# Patient Record
Sex: MALE | Race: WHITE | Employment: OTHER | URBAN - METROPOLITAN AREA
[De-identification: names, ages, dates, MRNs, and addresses within clinical notes are randomized per-mention and may not be internally consistent; named-entity substitution may affect disease eponyms.]

---

## 2018-10-16 ENCOUNTER — APPOINTMENT (OUTPATIENT)
Dept: GENERAL RADIOLOGY | Age: 73
End: 2018-10-16
Attending: UROLOGY
Payer: MEDICARE

## 2018-10-16 ENCOUNTER — ANESTHESIA (OUTPATIENT)
Dept: OPERATING ROOM | Age: 73
End: 2018-10-16
Payer: MEDICARE

## 2018-10-16 ENCOUNTER — HOSPITAL ENCOUNTER (OUTPATIENT)
Age: 73
Setting detail: OUTPATIENT SURGERY
Discharge: HOME | End: 2018-10-16
Attending: UROLOGY | Admitting: UROLOGY
Payer: MEDICARE

## 2018-10-16 ENCOUNTER — ANESTHESIA EVENT (OUTPATIENT)
Dept: OPERATING ROOM | Age: 73
End: 2018-10-16
Payer: MEDICARE

## 2018-10-16 VITALS
WEIGHT: 204.31 LBS | OXYGEN SATURATION: 97 % | RESPIRATION RATE: 16 BRPM | HEART RATE: 88 BPM | SYSTOLIC BLOOD PRESSURE: 164 MMHG | TEMPERATURE: 96.8 F | HEIGHT: 73 IN | DIASTOLIC BLOOD PRESSURE: 86 MMHG | BODY MASS INDEX: 27.08 KG/M2

## 2018-10-16 VITALS — SYSTOLIC BLOOD PRESSURE: 93 MMHG | OXYGEN SATURATION: 98 % | DIASTOLIC BLOOD PRESSURE: 54 MMHG

## 2018-10-16 DIAGNOSIS — G89.18 POSTOPERATIVE PAIN: Primary | ICD-10-CM

## 2018-10-16 LAB
ANION GAP SERPL CALCULATED.3IONS-SCNC: 12 MMOL/L (ref 9–17)
BUN BLDV-MCNC: 16 MG/DL (ref 8–23)
CHLORIDE BLD-SCNC: 103 MMOL/L (ref 98–107)
CO2: 25 MMOL/L (ref 20–31)
CREAT SERPL-MCNC: 0.7 MG/DL (ref 0.7–1.2)
GFR AFRICAN AMERICAN: >60 ML/MIN
GFR NON-AFRICAN AMERICAN: >60 ML/MIN
GFR SERPL CREATININE-BSD FRML MDRD: NORMAL ML/MIN/{1.73_M2}
GFR SERPL CREATININE-BSD FRML MDRD: NORMAL ML/MIN/{1.73_M2}
GLUCOSE BLD-MCNC: 122 MG/DL (ref 75–110)
GLUCOSE BLD-MCNC: 133 MG/DL (ref 75–110)
HCT VFR BLD CALC: 41 % (ref 41–53)
HEMOGLOBIN: 13.9 G/DL (ref 13.5–17.5)
MCH RBC QN AUTO: 27.3 PG (ref 26–34)
MCHC RBC AUTO-ENTMCNC: 33.8 G/DL (ref 31–37)
MCV RBC AUTO: 80.9 FL (ref 80–100)
NRBC AUTOMATED: NORMAL PER 100 WBC
PDW BLD-RTO: 13.8 % (ref 11.5–14.5)
PLATELET # BLD: 306 K/UL (ref 130–400)
PMV BLD AUTO: 10.1 FL (ref 6–12)
POTASSIUM SERPL-SCNC: 4.6 MMOL/L (ref 3.7–5.3)
RBC # BLD: 5.07 M/UL (ref 4.5–5.9)
SODIUM BLD-SCNC: 140 MMOL/L (ref 135–144)
WBC # BLD: 9.5 K/UL (ref 3.5–11)

## 2018-10-16 PROCEDURE — 3600000012 HC SURGERY LEVEL 2 ADDTL 15MIN: Performed by: UROLOGY

## 2018-10-16 PROCEDURE — 74018 RADEX ABDOMEN 1 VIEW: CPT

## 2018-10-16 PROCEDURE — 84520 ASSAY OF UREA NITROGEN: CPT

## 2018-10-16 PROCEDURE — 3700000000 HC ANESTHESIA ATTENDED CARE: Performed by: UROLOGY

## 2018-10-16 PROCEDURE — 7100000010 HC PHASE II RECOVERY - FIRST 15 MIN: Performed by: UROLOGY

## 2018-10-16 PROCEDURE — 82565 ASSAY OF CREATININE: CPT

## 2018-10-16 PROCEDURE — 82947 ASSAY GLUCOSE BLOOD QUANT: CPT

## 2018-10-16 PROCEDURE — 2580000003 HC RX 258: Performed by: ANESTHESIOLOGY

## 2018-10-16 PROCEDURE — 3600000002 HC SURGERY LEVEL 2 BASE: Performed by: UROLOGY

## 2018-10-16 PROCEDURE — 6360000002 HC RX W HCPCS: Performed by: ANESTHESIOLOGY

## 2018-10-16 PROCEDURE — 7100000011 HC PHASE II RECOVERY - ADDTL 15 MIN: Performed by: UROLOGY

## 2018-10-16 PROCEDURE — 7100000001 HC PACU RECOVERY - ADDTL 15 MIN: Performed by: UROLOGY

## 2018-10-16 PROCEDURE — 2709999900 HC NON-CHARGEABLE SUPPLY: Performed by: UROLOGY

## 2018-10-16 PROCEDURE — 85027 COMPLETE CBC AUTOMATED: CPT

## 2018-10-16 PROCEDURE — 7100000000 HC PACU RECOVERY - FIRST 15 MIN: Performed by: UROLOGY

## 2018-10-16 PROCEDURE — 6360000002 HC RX W HCPCS: Performed by: UROLOGY

## 2018-10-16 PROCEDURE — 2500000003 HC RX 250 WO HCPCS: Performed by: ANESTHESIOLOGY

## 2018-10-16 PROCEDURE — 3700000001 HC ADD 15 MINUTES (ANESTHESIA): Performed by: UROLOGY

## 2018-10-16 PROCEDURE — 80051 ELECTROLYTE PANEL: CPT

## 2018-10-16 RX ORDER — HYDROMORPHONE HCL 110MG/55ML
0.5 PATIENT CONTROLLED ANALGESIA SYRINGE INTRAVENOUS EVERY 5 MIN PRN
Status: DISCONTINUED | OUTPATIENT
Start: 2018-10-16 | End: 2018-10-16 | Stop reason: HOSPADM

## 2018-10-16 RX ORDER — ONDANSETRON 2 MG/ML
4 INJECTION INTRAMUSCULAR; INTRAVENOUS
Status: DISCONTINUED | OUTPATIENT
Start: 2018-10-16 | End: 2018-10-16 | Stop reason: HOSPADM

## 2018-10-16 RX ORDER — DOFETILIDE 0.25 MG/1
250 CAPSULE ORAL EVERY 12 HOURS
COMMUNITY

## 2018-10-16 RX ORDER — MEPERIDINE HYDROCHLORIDE 50 MG/ML
12.5 INJECTION INTRAMUSCULAR; INTRAVENOUS; SUBCUTANEOUS EVERY 5 MIN PRN
Status: DISCONTINUED | OUTPATIENT
Start: 2018-10-16 | End: 2018-10-16 | Stop reason: HOSPADM

## 2018-10-16 RX ORDER — DIPHENHYDRAMINE HYDROCHLORIDE 50 MG/ML
12.5 INJECTION INTRAMUSCULAR; INTRAVENOUS
Status: DISCONTINUED | OUTPATIENT
Start: 2018-10-16 | End: 2018-10-16 | Stop reason: HOSPADM

## 2018-10-16 RX ORDER — PROPOFOL 10 MG/ML
INJECTION, EMULSION INTRAVENOUS PRN
Status: DISCONTINUED | OUTPATIENT
Start: 2018-10-16 | End: 2018-10-16 | Stop reason: SDUPTHER

## 2018-10-16 RX ORDER — DEXAMETHASONE SODIUM PHOSPHATE 10 MG/ML
4 INJECTION INTRAMUSCULAR; INTRAVENOUS
Status: DISCONTINUED | OUTPATIENT
Start: 2018-10-16 | End: 2018-10-16 | Stop reason: HOSPADM

## 2018-10-16 RX ORDER — HYDRALAZINE HYDROCHLORIDE 20 MG/ML
5 INJECTION INTRAMUSCULAR; INTRAVENOUS EVERY 10 MIN PRN
Status: DISCONTINUED | OUTPATIENT
Start: 2018-10-16 | End: 2018-10-16 | Stop reason: HOSPADM

## 2018-10-16 RX ORDER — FENTANYL CITRATE 50 UG/ML
25 INJECTION, SOLUTION INTRAMUSCULAR; INTRAVENOUS EVERY 5 MIN PRN
Status: DISCONTINUED | OUTPATIENT
Start: 2018-10-16 | End: 2018-10-16 | Stop reason: HOSPADM

## 2018-10-16 RX ORDER — MIDAZOLAM HYDROCHLORIDE 1 MG/ML
INJECTION INTRAMUSCULAR; INTRAVENOUS PRN
Status: DISCONTINUED | OUTPATIENT
Start: 2018-10-16 | End: 2018-10-16 | Stop reason: SDUPTHER

## 2018-10-16 RX ORDER — SPIRONOLACTONE 25 MG/1
12.5 TABLET ORAL DAILY
COMMUNITY

## 2018-10-16 RX ORDER — SODIUM CHLORIDE, SODIUM LACTATE, POTASSIUM CHLORIDE, CALCIUM CHLORIDE 600; 310; 30; 20 MG/100ML; MG/100ML; MG/100ML; MG/100ML
INJECTION, SOLUTION INTRAVENOUS CONTINUOUS PRN
Status: DISCONTINUED | OUTPATIENT
Start: 2018-10-16 | End: 2018-10-16 | Stop reason: SDUPTHER

## 2018-10-16 RX ORDER — SIMVASTATIN 40 MG
40 TABLET ORAL NIGHTLY
COMMUNITY

## 2018-10-16 RX ORDER — METOPROLOL SUCCINATE 200 MG/1
200 TABLET, EXTENDED RELEASE ORAL DAILY
COMMUNITY

## 2018-10-16 RX ORDER — FINASTERIDE 5 MG/1
5 TABLET, FILM COATED ORAL DAILY
COMMUNITY

## 2018-10-16 RX ORDER — HYDROCODONE BITARTRATE AND ACETAMINOPHEN 5; 325 MG/1; MG/1
1 TABLET ORAL EVERY 6 HOURS PRN
Qty: 10 TABLET | Refills: 0 | Status: SHIPPED | OUTPATIENT
Start: 2018-10-16 | End: 2018-10-19

## 2018-10-16 RX ORDER — SODIUM CHLORIDE, SODIUM LACTATE, POTASSIUM CHLORIDE, CALCIUM CHLORIDE 600; 310; 30; 20 MG/100ML; MG/100ML; MG/100ML; MG/100ML
INJECTION, SOLUTION INTRAVENOUS CONTINUOUS
Status: DISCONTINUED | OUTPATIENT
Start: 2018-10-16 | End: 2018-10-16 | Stop reason: HOSPADM

## 2018-10-16 RX ORDER — LABETALOL HYDROCHLORIDE 5 MG/ML
5 INJECTION, SOLUTION INTRAVENOUS EVERY 10 MIN PRN
Status: DISCONTINUED | OUTPATIENT
Start: 2018-10-16 | End: 2018-10-16 | Stop reason: HOSPADM

## 2018-10-16 RX ORDER — LIDOCAINE HYDROCHLORIDE 20 MG/ML
INJECTION, SOLUTION EPIDURAL; INFILTRATION; INTRACAUDAL; PERINEURAL PRN
Status: DISCONTINUED | OUTPATIENT
Start: 2018-10-16 | End: 2018-10-16 | Stop reason: SDUPTHER

## 2018-10-16 RX ORDER — NITROGLYCERIN 0.4 MG/1
0.4 TABLET SUBLINGUAL EVERY 5 MIN PRN
COMMUNITY

## 2018-10-16 RX ORDER — FENTANYL CITRATE 50 UG/ML
INJECTION, SOLUTION INTRAMUSCULAR; INTRAVENOUS PRN
Status: DISCONTINUED | OUTPATIENT
Start: 2018-10-16 | End: 2018-10-16 | Stop reason: SDUPTHER

## 2018-10-16 RX ORDER — GLIMEPIRIDE 1 MG/1
1 TABLET ORAL
COMMUNITY

## 2018-10-16 RX ORDER — ONDANSETRON 2 MG/ML
INJECTION INTRAMUSCULAR; INTRAVENOUS PRN
Status: DISCONTINUED | OUTPATIENT
Start: 2018-10-16 | End: 2018-10-16 | Stop reason: SDUPTHER

## 2018-10-16 RX ORDER — M-VIT,TX,IRON,MINS/CALC/FOLIC 27MG-0.4MG
1 TABLET ORAL DAILY
COMMUNITY

## 2018-10-16 RX ORDER — LISINOPRIL 40 MG/1
40 TABLET ORAL DAILY
COMMUNITY

## 2018-10-16 RX ORDER — DEXAMETHASONE SODIUM PHOSPHATE 10 MG/ML
INJECTION INTRAMUSCULAR; INTRAVENOUS PRN
Status: DISCONTINUED | OUTPATIENT
Start: 2018-10-16 | End: 2018-10-16 | Stop reason: SDUPTHER

## 2018-10-16 RX ORDER — CHLORAL HYDRATE 500 MG
2000 CAPSULE ORAL DAILY
Status: ON HOLD | COMMUNITY
End: 2018-10-16 | Stop reason: CLARIF

## 2018-10-16 RX ORDER — CYANOCOBALAMIN (VITAMIN B-12) 1000 MCG
1 TABLET, EXTENDED RELEASE ORAL 2 TIMES DAILY WITH MEALS
COMMUNITY

## 2018-10-16 RX ADMIN — SODIUM CHLORIDE, POTASSIUM CHLORIDE, SODIUM LACTATE AND CALCIUM CHLORIDE: 600; 310; 30; 20 INJECTION, SOLUTION INTRAVENOUS at 14:49

## 2018-10-16 RX ADMIN — ONDANSETRON 4 MG: 2 INJECTION, SOLUTION INTRAMUSCULAR; INTRAVENOUS at 16:35

## 2018-10-16 RX ADMIN — MIDAZOLAM 2 MG: 1 INJECTION INTRAMUSCULAR; INTRAVENOUS at 16:14

## 2018-10-16 RX ADMIN — DEXAMETHASONE SODIUM PHOSPHATE 10 MG: 10 INJECTION INTRAMUSCULAR; INTRAVENOUS at 16:25

## 2018-10-16 RX ADMIN — PROPOFOL 200 MG: 10 INJECTION, EMULSION INTRAVENOUS at 16:22

## 2018-10-16 RX ADMIN — LIDOCAINE HYDROCHLORIDE 5 ML: 20 INJECTION, SOLUTION EPIDURAL; INFILTRATION; INTRACAUDAL; PERINEURAL at 16:21

## 2018-10-16 RX ADMIN — SODIUM CHLORIDE, POTASSIUM CHLORIDE, SODIUM LACTATE AND CALCIUM CHLORIDE: 600; 310; 30; 20 INJECTION, SOLUTION INTRAVENOUS at 16:14

## 2018-10-16 RX ADMIN — CEFAZOLIN SODIUM 2000 MG: 2 SOLUTION INTRAVENOUS at 16:17

## 2018-10-16 RX ADMIN — FENTANYL CITRATE 100 MCG: 50 INJECTION, SOLUTION INTRAMUSCULAR; INTRAVENOUS at 16:21

## 2018-10-16 RX ADMIN — DEXAMETHASONE SODIUM PHOSPHATE 10 MG: 10 INJECTION INTRAMUSCULAR; INTRAVENOUS at 16:59

## 2018-10-16 ASSESSMENT — PULMONARY FUNCTION TESTS
PIF_VALUE: 6
PIF_VALUE: 11
PIF_VALUE: 2
PIF_VALUE: 1
PIF_VALUE: 0
PIF_VALUE: 1
PIF_VALUE: 0
PIF_VALUE: 10
PIF_VALUE: 11
PIF_VALUE: 11
PIF_VALUE: 1
PIF_VALUE: 11
PIF_VALUE: 11
PIF_VALUE: 3
PIF_VALUE: 4
PIF_VALUE: 1
PIF_VALUE: 11
PIF_VALUE: 10
PIF_VALUE: 1
PIF_VALUE: 10
PIF_VALUE: 3
PIF_VALUE: 3
PIF_VALUE: 2
PIF_VALUE: 1
PIF_VALUE: 11
PIF_VALUE: 10
PIF_VALUE: 11
PIF_VALUE: 1
PIF_VALUE: 11
PIF_VALUE: 1
PIF_VALUE: 2
PIF_VALUE: 11
PIF_VALUE: 16
PIF_VALUE: 11
PIF_VALUE: 14
PIF_VALUE: 3
PIF_VALUE: 11
PIF_VALUE: 1
PIF_VALUE: 11
PIF_VALUE: 2
PIF_VALUE: 11
PIF_VALUE: 14
PIF_VALUE: 3
PIF_VALUE: 1
PIF_VALUE: 1
PIF_VALUE: 11
PIF_VALUE: 11
PIF_VALUE: 17
PIF_VALUE: 11
PIF_VALUE: 2
PIF_VALUE: 10
PIF_VALUE: 0
PIF_VALUE: 11
PIF_VALUE: 2

## 2018-10-16 ASSESSMENT — PAIN SCALES - GENERAL
PAINLEVEL_OUTOF10: 0

## 2018-10-16 ASSESSMENT — PAIN DESCRIPTION - DESCRIPTORS: DESCRIPTORS: CRAMPING

## 2018-10-16 ASSESSMENT — PAIN - FUNCTIONAL ASSESSMENT: PAIN_FUNCTIONAL_ASSESSMENT: 0-10

## 2018-10-16 NOTE — H&P
Mely Iqbal MD  History and Physical    Patient:  Regis Urrutia  MRN: [id number]  YOB: 1945    HISTORY OF PRESENT ILLNESS:     The patient is a 75 y.o. male who presents with left kidney stone. Here for procedure. Patient's old records, notes and chart reviewed and summarized above. Mely Iqbal MD independently reviewed the images and verified the radiology reports from:    Xr Abdomen (kub) (single Ap View)    Result Date: 10/16/2018  EXAMINATION: SINGLE SUPINE XRAY VIEW(S) OF THE ABDOMEN 10/16/2018 3:13 pm COMPARISON: None. HISTORY: ORDERING SYSTEM PROVIDED HISTORY: pre-op eswl, kidney stones TECHNOLOGIST PROVIDED HISTORY: pre-op eswl, kidney stones Ordering Physician Provided Reason for Exam: pre-op for left kidney stone Acuity: Unknown Type of Exam: Unknown Additional signs and symptoms: left flank pain Relevant Medical/Surgical History: kidney stone left FINDINGS: There is a double-J ureteral stent on the left. There appears to be a 9 x 17 mm radiodensity adjacent to the proximal aspect of the stent. Bowel gas pattern nonspecific. Probable large stone near the ureteropelvic junction on the left and ureteral stent as above.          Past Medical History:    Past Medical History:   Diagnosis Date    Arthritis     Atrial flutter ([address]     Blindness of left eye     CAD (coronary artery disease)     Diabetes mellitus ([address]     Heart attack ([address] 1998    Hyperlipidemia     Hypertension     Kidney stone     left, ureteral stent    Mitral valve regurgitation     patient denies       Past Surgical History:    Past Surgical History:   Procedure Laterality Date    ANKLE FUSION Right     APPENDECTOMY      CORONARY ANGIOPLASTY WITH STENT PLACEMENT  2012    CORONARY ARTERY BYPASS GRAFT  1998    3 vessels    EYE SURGERY Left     GASTRIC BYPASS SURGERY      TONSILLECTOMY         Medications Prior to Admission:    Prior to Admission medications    Medication Sig Start Date End Alcohol use Yes      Comment: rare     Drug use: No    Sexual activity: Not on file     Other Topics Concern    Not on file     Social History Narrative    No narrative on file       Family History:  History reviewed. No pertinent family history. REVIEW OF SYSTEMS:  Constitutional: negative  Eyes: negative  Respiratory: negative  Cardiovascular: negative  Gastrointestinal: negative  Genitourinary: no acute issues  Musculoskeletal: negative  Skin: negative   Neurological: negative  Hematological/Lymphatic: negative  Psychological: negative    Physical Exam:      Patient Vitals for the past 24 hrs:   BP Temp Temp src Pulse Resp SpO2 Height Weight   10/16/18 1411 (!) 147/70 97.7 °F (36.5 °C) Oral 63 16 97 % - -   10/16/18 1410 - - - - - - 6' 1\" (1.854 m) 204 lb 5 oz (92.7 kg)     Constitutional: Patient in no acute distress; Neuro: alert and oriented to person place and time. Psych: Mood and affect normal.  Skin: Normal  Lungs: Respiratory effort normal, CTA  Cardiovascular:  Normal peripheral pulses; no murmur. Normal rhythm  Abdomen: Soft, non-tender, non-distended with no CVA, flank pain, hepatosplenomegaly or hernia. Kidneys normal.  Bladder non-tender and not distended. LABS:   Recent Labs      10/16/18   1435   WBC  9.5   HGB  13.9   HCT  41.0   MCV  80.9   PLT  306     Recent Labs      10/16/18   1435   NA  140   K  4.6   CL  103   CO2  25   BUN  16   CREATININE  0.70     No results found for: PSA      Urinalysis: No results for input(s): COLORU, PHUR, LABCAST, WBCUA, RBCUA, MUCUS, TRICHOMONAS, YEAST, BACTERIA, CLARITYU, SPECGRAV, LEUKOCYTESUR, UROBILINOGEN, BILIRUBINUR, BLOODU in the last 72 hours.     Invalid input(s): NITRATE, GLUCOSEUKETONESUAMORPHOUS     -----------------------------------------------------------------      Assessment and Plan     Impression:   Left UPJ stone      Plan:   Left ESWL    Consent obtained; Left ESWL in OR today    Dakota Farnsworth MD  3:59 PM 10/16/2018

## 2018-10-17 NOTE — OP NOTE
Patient:  Victoria Joiner  MRN: [id number]  YOB: 1945    FACILITY: [address] [place] [address].: 10/16/2018    SURGEON: Brock Brooks MD     ASSISTANT:  none    PREOPERATIVE DIAGNOSIS: LEFT KIDNEY STONE     POSTOPERATIVE DIAGNOSIS:  Left kidney stone    PROCEDURE PERFORMED:     1. Left Extracorporeal Shock Wave Lithotripsy        2. Cystoscopy with left stent removal    ANESTHESIA: General    ESTIMATED BLOOD LOSS: * No values recorded between 10/16/2018  4:15 PM and 10/16/2018  1:65 PM *     COMPLICATIONS: None immediate    DRAINS:  none    SPECIMENS: * No specimens in log *    INDICATIONS FOR PROCEDURE:  The patient is a 75 y.o. male who presents today with LEFT KIDNEY STONE  here for ESWL EXTRACORPEAL SHOCK WAVE LITHOTRIPSY LEFT WITH CYSTO AND LEFT STENT REMOVAL. After risks, benefits and alternatives of the procedure were discussed with the patient, the patient elected to proceed. DESCRIPTION OF PROCEDURE:  The patient was placed in the supine position and underwent anesthesia induction. Preoperative antibiotics were given. A proper time out was performed. Using fluoroscopy the stone was visualized in the  Proximal ureter as noted previously on KUB. The stone was then treated with  3000 shocks with power levels 4 to 8. The patient's stone seemed to  break up throughout the case and the patient did well with no complications. Prior to conclusion of the case we performed cystoscopy. A flexible cystoscope was inserted into the patient's urethral meatus after reprep and drape the patient's genitals. We advanced our scope into the patient's bladder grabbed the indwelling ureteral stent and pulled it out of the urethra in its entirety. At this point the procedure was complete. CONDITION:  Good.   Follow up in clinic with a KUB in 3 months

## 2022-12-02 RX ORDER — METFORMIN HYDROCHLORIDE 750 MG/1
750 TABLET, EXTENDED RELEASE ORAL 2 TIMES DAILY WITH MEALS
COMMUNITY

## 2022-12-02 RX ORDER — FUROSEMIDE 40 MG/1
40 TABLET ORAL DAILY
COMMUNITY

## 2022-12-02 RX ORDER — TAMSULOSIN HYDROCHLORIDE 0.4 MG/1
0.4 CAPSULE ORAL DAILY
COMMUNITY

## 2022-12-07 RX ORDER — SODIUM CHLORIDE 9 MG/ML
INJECTION, SOLUTION INTRAVENOUS CONTINUOUS
Status: DISCONTINUED | OUTPATIENT
Start: 2022-12-07 | End: 2022-12-07 | Stop reason: SDUPTHER

## 2022-12-07 RX ORDER — ASPIRIN 81 MG/1
81 TABLET, CHEWABLE ORAL ONCE
Status: DISCONTINUED | OUTPATIENT
Start: 2022-12-07 | End: 2022-12-07 | Stop reason: SDUPTHER

## 2022-12-08 ENCOUNTER — HOSPITAL ENCOUNTER (OUTPATIENT)
Dept: CARDIAC CATH/INVASIVE PROCEDURES | Age: 77
Discharge: HOME | End: 2022-12-09
Attending: INTERNAL MEDICINE | Admitting: INTERNAL MEDICINE
Payer: MEDICARE

## 2022-12-08 PROBLEM — I10 HYPERTENSION: Status: ACTIVE | Noted: 2022-12-08

## 2022-12-08 PROBLEM — E78.5 HYPERLIPEMIA: Status: ACTIVE | Noted: 2022-12-08

## 2022-12-08 PROBLEM — I25.5 ISCHEMIC CARDIOMYOPATHY: Status: ACTIVE | Noted: 2022-12-08

## 2022-12-08 PROBLEM — I50.22 CHRONIC SYSTOLIC HEART FAILURE (HCC): Chronic | Status: ACTIVE | Noted: 2022-12-08

## 2022-12-08 PROBLEM — I25.10 CORONARY ATHEROSCLEROSIS: Status: ACTIVE | Noted: 2022-12-08

## 2022-12-08 PROBLEM — E11.9 TYPE 2 DIABETES MELLITUS (HCC): Status: ACTIVE | Noted: 2022-12-08

## 2022-12-08 PROBLEM — I48.0 PAROXYSMAL ATRIAL FIBRILLATION (HCC): Status: ACTIVE | Noted: 2018-10-26

## 2022-12-08 PROBLEM — Z95.1 STATUS POST THREE VESSEL CORONARY ARTERY BYPASS: Status: ACTIVE | Noted: 2022-12-08

## 2022-12-08 LAB
ANION GAP SERPL CALCULATED.3IONS-SCNC: 11 MMOL/L (ref 9–17)
BUN BLDV-MCNC: 24 MG/DL (ref 8–23)
BUN/CREAT BLD: 31 (ref 9–20)
CALCIUM SERPL-MCNC: 8.9 MG/DL (ref 8.6–10.4)
CHLORIDE BLD-SCNC: 104 MMOL/L (ref 98–107)
CHOLESTEROL/HDL RATIO: 3
CHOLESTEROL: 96 MG/DL
CO2: 24 MMOL/L (ref 20–31)
CREAT SERPL-MCNC: 0.78 MG/DL (ref 0.7–1.2)
GFR SERPL CREATININE-BSD FRML MDRD: >60 ML/MIN/1.73M2
GLUCOSE BLD-MCNC: 119 MG/DL (ref 70–99)
HCT VFR BLD CALC: 41.4 % (ref 40.7–50.3)
HDLC SERPL-MCNC: 32 MG/DL
HEMOGLOBIN: 13 G/DL (ref 13–17)
LDL CHOLESTEROL: 46 MG/DL (ref 0–130)
MCH RBC QN AUTO: 24.3 PG (ref 25.2–33.5)
MCHC RBC AUTO-ENTMCNC: 31.4 G/DL (ref 28.4–34.8)
MCV RBC AUTO: 77.2 FL (ref 82.6–102.9)
NRBC AUTOMATED: 0 PER 100 WBC
PDW BLD-RTO: 15.6 % (ref 11.8–14.4)
PLATELET # BLD: 244 K/UL (ref 138–453)
PMV BLD AUTO: 12.1 FL (ref 8.1–13.5)
POTASSIUM SERPL-SCNC: 4.7 MMOL/L (ref 3.7–5.3)
RBC # BLD: 5.36 M/UL (ref 4.21–5.77)
SODIUM BLD-SCNC: 139 MMOL/L (ref 135–144)
TRIGL SERPL-MCNC: 90 MG/DL
WBC # BLD: 9.6 K/UL (ref 3.5–11.3)

## 2022-12-08 PROCEDURE — 6370000000 HC RX 637 (ALT 250 FOR IP)

## 2022-12-08 PROCEDURE — 2580000003 HC RX 258: Performed by: INTERNAL MEDICINE

## 2022-12-08 PROCEDURE — C1894 INTRO/SHEATH, NON-LASER: HCPCS

## 2022-12-08 PROCEDURE — 2500000003 HC RX 250 WO HCPCS

## 2022-12-08 PROCEDURE — 2580000003 HC RX 258

## 2022-12-08 PROCEDURE — C1753 CATH, INTRAVAS ULTRASOUND: HCPCS

## 2022-12-08 PROCEDURE — 6360000002 HC RX W HCPCS

## 2022-12-08 PROCEDURE — 2709999900 HC NON-CHARGEABLE SUPPLY

## 2022-12-08 PROCEDURE — 6370000000 HC RX 637 (ALT 250 FOR IP): Performed by: INTERNAL MEDICINE

## 2022-12-08 PROCEDURE — 93459 L HRT ART/GRFT ANGIO: CPT

## 2022-12-08 PROCEDURE — 92978 ENDOLUMINL IVUS OCT C 1ST: CPT

## 2022-12-08 PROCEDURE — 80048 BASIC METABOLIC PNL TOTAL CA: CPT

## 2022-12-08 PROCEDURE — 80061 LIPID PANEL: CPT

## 2022-12-08 PROCEDURE — 93005 ELECTROCARDIOGRAM TRACING: CPT | Performed by: INTERNAL MEDICINE

## 2022-12-08 PROCEDURE — C1887 CATHETER, GUIDING: HCPCS

## 2022-12-08 PROCEDURE — 36415 COLL VENOUS BLD VENIPUNCTURE: CPT

## 2022-12-08 PROCEDURE — 93461 R&L HRT ART/VENTRICLE ANGIO: CPT

## 2022-12-08 PROCEDURE — C9600 PERC DRUG-EL COR STENT SING: HCPCS

## 2022-12-08 PROCEDURE — 93306 TTE W/DOPPLER COMPLETE: CPT

## 2022-12-08 PROCEDURE — C1874 STENT, COATED/COV W/DEL SYS: HCPCS

## 2022-12-08 PROCEDURE — C1725 CATH, TRANSLUMIN NON-LASER: HCPCS

## 2022-12-08 PROCEDURE — C1769 GUIDE WIRE: HCPCS

## 2022-12-08 PROCEDURE — 99221 1ST HOSP IP/OBS SF/LOW 40: CPT | Performed by: NURSE PRACTITIONER

## 2022-12-08 PROCEDURE — 85027 COMPLETE CBC AUTOMATED: CPT

## 2022-12-08 PROCEDURE — 6360000004 HC RX CONTRAST MEDICATION

## 2022-12-08 PROCEDURE — C1751 CATH, INF, PER/CENT/MIDLINE: HCPCS

## 2022-12-08 PROCEDURE — C1760 CLOSURE DEV, VASC: HCPCS

## 2022-12-08 RX ORDER — SODIUM CHLORIDE 9 MG/ML
INJECTION, SOLUTION INTRAVENOUS CONTINUOUS
Status: ACTIVE | OUTPATIENT
Start: 2022-12-08 | End: 2022-12-09

## 2022-12-08 RX ORDER — FINASTERIDE 5 MG/1
5 TABLET, FILM COATED ORAL DAILY
Status: DISCONTINUED | OUTPATIENT
Start: 2022-12-08 | End: 2022-12-09 | Stop reason: HOSPADM

## 2022-12-08 RX ORDER — ACETAMINOPHEN 325 MG/1
650 TABLET ORAL EVERY 4 HOURS PRN
Status: DISCONTINUED | OUTPATIENT
Start: 2022-12-08 | End: 2022-12-09 | Stop reason: HOSPADM

## 2022-12-08 RX ORDER — FUROSEMIDE 40 MG/1
40 TABLET ORAL DAILY
Status: DISCONTINUED | OUTPATIENT
Start: 2022-12-08 | End: 2022-12-09 | Stop reason: HOSPADM

## 2022-12-08 RX ORDER — SODIUM CHLORIDE 9 MG/ML
INJECTION, SOLUTION INTRAVENOUS CONTINUOUS
Status: DISCONTINUED | OUTPATIENT
Start: 2022-12-08 | End: 2022-12-09 | Stop reason: HOSPADM

## 2022-12-08 RX ORDER — GLIPIZIDE 10 MG/1
5 TABLET ORAL
Status: DISCONTINUED | OUTPATIENT
Start: 2022-12-09 | End: 2022-12-09 | Stop reason: HOSPADM

## 2022-12-08 RX ORDER — DEXTROSE MONOHYDRATE 100 MG/ML
INJECTION, SOLUTION INTRAVENOUS CONTINUOUS PRN
Status: DISCONTINUED | OUTPATIENT
Start: 2022-12-08 | End: 2022-12-09 | Stop reason: HOSPADM

## 2022-12-08 RX ORDER — ASPIRIN 81 MG/1
81 TABLET, CHEWABLE ORAL DAILY
Status: DISCONTINUED | OUTPATIENT
Start: 2022-12-09 | End: 2022-12-09 | Stop reason: HOSPADM

## 2022-12-08 RX ORDER — ASPIRIN 81 MG/1
81 TABLET, CHEWABLE ORAL ONCE
Status: COMPLETED | OUTPATIENT
Start: 2022-12-08 | End: 2022-12-08

## 2022-12-08 RX ORDER — SODIUM CHLORIDE 0.9 % (FLUSH) 0.9 %
5-40 SYRINGE (ML) INJECTION PRN
Status: DISCONTINUED | OUTPATIENT
Start: 2022-12-08 | End: 2022-12-09 | Stop reason: HOSPADM

## 2022-12-08 RX ORDER — DOFETILIDE 0.25 MG/1
250 CAPSULE ORAL EVERY 12 HOURS
Status: DISCONTINUED | OUTPATIENT
Start: 2022-12-08 | End: 2022-12-09 | Stop reason: HOSPADM

## 2022-12-08 RX ORDER — CLOPIDOGREL BISULFATE 75 MG/1
75 TABLET ORAL DAILY
Status: DISCONTINUED | OUTPATIENT
Start: 2022-12-09 | End: 2022-12-09 | Stop reason: HOSPADM

## 2022-12-08 RX ORDER — TAMSULOSIN HYDROCHLORIDE 0.4 MG/1
0.4 CAPSULE ORAL DAILY
Status: DISCONTINUED | OUTPATIENT
Start: 2022-12-08 | End: 2022-12-09 | Stop reason: HOSPADM

## 2022-12-08 RX ORDER — SODIUM CHLORIDE 0.9 % (FLUSH) 0.9 %
5-40 SYRINGE (ML) INJECTION EVERY 12 HOURS SCHEDULED
Status: DISCONTINUED | OUTPATIENT
Start: 2022-12-08 | End: 2022-12-09 | Stop reason: HOSPADM

## 2022-12-08 RX ORDER — SODIUM CHLORIDE 9 MG/ML
INJECTION, SOLUTION INTRAVENOUS PRN
Status: DISCONTINUED | OUTPATIENT
Start: 2022-12-08 | End: 2022-12-09 | Stop reason: HOSPADM

## 2022-12-08 RX ORDER — LISINOPRIL 20 MG/1
20 TABLET ORAL DAILY
Status: DISCONTINUED | OUTPATIENT
Start: 2022-12-08 | End: 2022-12-09 | Stop reason: HOSPADM

## 2022-12-08 RX ORDER — METOPROLOL SUCCINATE 50 MG/1
200 TABLET, EXTENDED RELEASE ORAL DAILY
Status: DISCONTINUED | OUTPATIENT
Start: 2022-12-08 | End: 2022-12-09

## 2022-12-08 RX ORDER — ATORVASTATIN CALCIUM 20 MG/1
20 TABLET, FILM COATED ORAL DAILY
Status: DISCONTINUED | OUTPATIENT
Start: 2022-12-08 | End: 2022-12-09 | Stop reason: HOSPADM

## 2022-12-08 RX ORDER — ONDANSETRON 2 MG/ML
4 INJECTION INTRAMUSCULAR; INTRAVENOUS EVERY 6 HOURS PRN
Status: DISCONTINUED | OUTPATIENT
Start: 2022-12-08 | End: 2022-12-09 | Stop reason: HOSPADM

## 2022-12-08 RX ADMIN — SODIUM CHLORIDE: 9 INJECTION, SOLUTION INTRAVENOUS at 14:08

## 2022-12-08 RX ADMIN — SODIUM CHLORIDE: 9 INJECTION, SOLUTION INTRAVENOUS at 10:30

## 2022-12-08 RX ADMIN — ASPIRIN 81 MG CHEWABLE TABLET 81 MG: 81 TABLET CHEWABLE at 10:32

## 2022-12-08 ASSESSMENT — PAIN - FUNCTIONAL ASSESSMENT: PAIN_FUNCTIONAL_ASSESSMENT: 0-10

## 2022-12-08 NOTE — CONSULTS
[facility]  Office: [address], DO, Nora Tommy, DO, Enio Mendoza, DO, Suhas Toro Blood, DO, Heena Guaman MD, Samanta Lr MD, Anish Malloy MD, Zack Larry MD,  Cele Potter MD, Andres Marion MD, Luciana Javier, DO, Diego Patrick MD,  Jarrett Mason MD, Mary Garcia MD, Tra Fletcher, DO, Ashli Hess MD, Lera Mcardle, MD, Karie Gao, DO, Alek Kaminski MD, Michael Olmedo MD, Keith Lombard, MD, Liseth Zabala MD, Piedad Lauren DO, Kimberlee Mason MD, Albert Tobar MD, Mary Simon, CNP,  Katie Lucero, CNP, Meghana Ortega, CNP, Jani Lopez, CNP,  Shiela King, [facility] [place], Roman Franco, CNP, Biju Peacock, CNP, Eliseo Rosenberg, CNP, Regine Lundberg, CNP, Ginny Guerrero, CNP, Carmen Gonzales PA-C, Erick Chau, [facility], Eliezer Gram, CNP, Sadie Florez [facility] / HISTORY AND PHYSICAL EXAMINATION            Date:   12/8/2022  Patient name:  Ewa Munoz  Date of admission:  12/8/2022  9:56 AM  MRN:   [id number]  Account:  [de-identified]  YOB: 1945  PCP:    Darek Benton MD  Room:   2034/2034-01  Code Status:    Full Code    Physician Requesting Consult: Angelina Hester MD    Reason for Consult:  post cath medical management     Chief Complaint:     Sob with activity with lightheadedness     History Obtained From:     patient, electronic medical record    History of Present Illness: The patient was seen by cardiology in October with complaints of lightheadedness, dizziness, and  weakness with any activities. He has a history of persistent atrial fibrillation is maintained sinus rhythm on dofetilide therapy He also complained of shortness of breath upon exertion.  He worse a Holter monitor in Oct/Nov that showed Predominant rhythm sinus bradycardia, average heart rate 54 beats per minute with a High burden of supraventricular ectopy totaling 13.69%. History includes coronary disease with ischemic cardiomyopathy, DM, HTN, Paroxysmal A fib, and systolic Heart failure. Today's labs are WNL    Per cardiology [facility] lab note    PORFIRIO:   Mild moderate LV dysfunction, EF 40-45%  Moderate MR  RCA - mid 95%, LULU placed and post dilated with 3.0 NC balloon  LIMA to LAD  SVG to OM  Both patent    Past Medical History:     Past Medical History:   Diagnosis Date    Acute systolic (congestive) heart failure (HCC)     Arthritis     Atrial flutter (HCC)     Blindness of left eye     CAD (coronary artery disease)     Diabetes mellitus (HCC)     GERD (gastroesophageal reflux disease)     Heart attack ([facility] 1998    Hyperlipidemia     Hypertension     Kidney stone     left, ureteral stent    Mitral valve regurgitation     patient denies    Paroxysmal atrial fibrillation (HCC)     Severe mitral insufficiency     SOB (shortness of breath)         Past Surgical History:     Past Surgical History:   Procedure Laterality Date    ANKLE FUSION Right     APPENDECTOMY      COLONOSCOPY      CORONARY ANGIOPLASTY WITH STENT PLACEMENT  2012    CORONARY ARTERY BYPASS GRAFT  1998    3 vessels    EYE SURGERY Left     GASTRIC BYPASS SURGERY      LITHOTRIPSY  10/16/2018    ESWL EXTRACORPEAL SHOCK WAVE LITHOTRIPSY LEFT WITH CYSTO AND LEFT STENT REMOVAL   NEXMED (Left     SD FRAGMENT KIDNEY STONE/ ESWL Left 10/16/2018    ESWL EXTRACORPEAL SHOCK WAVE LITHOTRIPSY LEFT WITH CYSTO AND LEFT STENT REMOVAL   NEXMED performed by Garfield Barajas MD at [address] [place] [address]          Medications Prior to Admission:     Prior to Admission medications    Medication Sig Start Date End Date Taking?  Authorizing Provider   apixaban (ELIQUIS) 5 MG TABS tablet Take 5 mg by mouth 2 times daily   Yes Historical Provider, MD   furosemide (LASIX) 40 MG tablet Take 40 mg by mouth daily and as directed   Yes Historical Provider, MD   metFORMIN (GLUCOPHAGE-XR) 750 MG extended release tablet Take 750 mg by mouth 2 times daily (with meals)   Yes Historical Provider, MD   tamsulosin (FLOMAX) 0.4 MG capsule Take 0.4 mg by mouth daily   Yes Historical Provider, MD   calcium citrate-vitamin D (CITRICAL + D) 315-250 MG-UNIT TABS per tablet Take 1 tablet by mouth 2 times daily (with meals)    Historical Provider, MD   cyanocobalamin 1000 MCG tablet Take 1,000 mcg by mouth daily    Historical Provider, MD   dofetilide (TIKOSYN) 250 MCG capsule Take 250 mcg by mouth every 12 hours    Historical Provider, MD   finasteride (PROSCAR) 5 MG tablet Take 5 mg by mouth daily    Historical Provider, MD   glimepiride (AMARYL) 1 MG tablet Take 1 mg by mouth every morning (before breakfast)    Historical Provider, MD   lisinopril (PRINIVIL;ZESTRIL) 40 MG tablet Take 20 mg by mouth daily    Historical Provider, MD   metoprolol succinate (TOPROL XL) 200 MG extended release tablet Take 200 mg by mouth daily    Historical Provider, MD   Multiple Vitamins-Minerals (THERAPEUTIC MULTIVITAMIN-MINERALS) tablet Take 1 tablet by mouth daily    Historical Provider, MD   nitroGLYCERIN (NITROSTAT) 0.4 MG SL tablet Place 0.4 mg under the tongue every 5 minutes as needed for Chest pain up to max of 3 total doses. If no relief after 1 dose, call 911. Historical Provider, MD   simvastatin (ZOCOR) 40 MG tablet Take 40 mg by mouth nightly    Historical Provider, MD   spironolactone (ALDACTONE) 25 MG tablet Take 12.5 mg by mouth daily    Historical Provider, MD        Allergies:     Patient has no known allergies. Social History:     Tobacco:    reports that he quit smoking about 22 years ago. His smoking use included cigarettes. He smoked an average of 1 pack per day. He has never used smokeless tobacco.  Alcohol:      reports current alcohol use. Drug Use:  reports no history of drug use. Family History:     History reviewed. No pertinent family history. Review of Systems:     Positive and Negative as described in HPI.     Review of Systems All other systems reviewed and are negative. Physical Exam:     BP (!) 141/63   Pulse 55   Temp (P) 98.1 °F (36.7 °C) (Temporal)   Resp 19   Ht 6' 1\" (1.854 m)   Wt 175 lb (79.4 kg)   SpO2 95%   BMI 23.09 kg/m²   Temp (24hrs), Av.4 °F (36.9 °C), Min:98.1 °F (36.7 °C), Max:98.7 °F (37.1 °C)    No results for input(s): POCGLU in the last 72 hours. No intake or output data in the 24 hours ending 22 1730    Physical Exam  Vitals and nursing note reviewed. Constitutional:       Appearance: Normal appearance. HENT:      Mouth/Throat:      Mouth: Mucous membranes are moist.   Eyes:      Extraocular Movements: Extraocular movements intact. Conjunctiva/sclera: Conjunctivae normal.   Cardiovascular:      Rate and Rhythm: Normal rate. Rhythm irregular. Heart sounds: Murmur heard. Pulmonary:      Effort: Pulmonary effort is normal.      Breath sounds: Normal breath sounds. Abdominal:      General: Bowel sounds are normal.      Palpations: Abdomen is soft. Musculoskeletal:         General: Normal range of motion. Cervical back: Neck supple. Right lower leg: No edema. Left lower leg: No edema. Skin:     General: Skin is warm and dry. Capillary Refill: Capillary refill takes less than 2 seconds. Comments: Right groin soft, dressing intact   Neurological:      Mental Status: He is alert and oriented to person, place, and time. Psychiatric:         Behavior: Behavior normal.         Thought Content:  Thought content normal.       Investigations:      Laboratory Testing:  Recent Results (from the past 24 hour(s))   CBC    Collection Time: 22 10:00 AM   Result Value Ref Range    WBC 9.6 3.5 - 11.3 k/uL    RBC 5.36 4.21 - 5.77 m/uL    Hemoglobin 13.0 13.0 - 17.0 g/dL    Hematocrit 41.4 40.7 - 50.3 %    MCV 77.2 (L) 82.6 - 102.9 fL    MCH 24.3 (L) 25.2 - 33.5 pg    MCHC 31.4 28.4 - 34.8 g/dL    RDW 15.6 (H) 11.8 - 14.4 %    Platelets 107 771 - 612 k/uL    MPV 12.1 8.1 - 13.5 fL    NRBC Automated 0.0 0.0 per 100 WBC   Basic Metabolic Panel    Collection Time: 12/08/22 10:00 AM   Result Value Ref Range    Glucose 119 (H) 70 - 99 mg/dL    BUN 24 (H) 8 - 23 mg/dL    Creatinine 0.78 0.70 - 1.20 mg/dL    Est, Glom Filt Rate >60 >60 mL/min/1.73m2    Bun/Cre Ratio 31 (H) 9 - 20    Calcium 8.9 8.6 - 10.4 mg/dL    Sodium 139 135 - 144 mmol/L    Potassium 4.7 3.7 - 5.3 mmol/L    Chloride 104 98 - 107 mmol/L    CO2 24 20 - 31 mmol/L    Anion Gap 11 9 - 17 mmol/L   EKG 12 Lead    Collection Time: 12/08/22 10:47 AM   Result Value Ref Range    Ventricular Rate 61 BPM    Atrial Rate 61 BPM    P-R Interval 122 ms    QRS Duration 96 ms    Q-T Interval 494 ms    QTc Calculation (Bazett) 497 ms    P Axis 4 degrees    R Axis 18 degrees    T Axis 108 degrees   Lipid Panel    Collection Time: 12/08/22  2:41 PM   Result Value Ref Range    Cholesterol 96 <200 mg/dL    HDL 32 (L) >40 mg/dL    LDL Cholesterol 46 0 - 130 mg/dL    Chol/HDL Ratio 3.0 <5    Triglycerides 90 <150 mg/dL       Imaging/Diagonstics:  No results found.     Assessment :      Hospital Problems             Last Modified POA    * (Principal) Atherosclerosis of native coronary artery of native heart 12/8/2022 Yes    Type 2 diabetes mellitus ([facility] 12/8/2022 Yes    Hyperlipemia 12/8/2022 Yes    Hypertension 12/8/2022 Yes    Overview Signed 12/8/2022  5:20 PM by KELSEY Camacho CNP     1998         Paroxysmal atrial fibrillation ([address] 12/8/2022 Yes    Ischemic cardiomyopathy 12/8/2022 Yes    Chronic systolic heart failure ([address] (Chronic) 12/8/2022 Yes       Plan:     S/p heart cath-LULU to RCA  Post cath orders for cardiology  Plavix     Type 2 diabetes  Home Amaryl exchanged with Glucotrol while inpatient   Hold Metformin for now  Check glucose ac/hs    Hyperlipidemia  statin    HTN  Home BB (parameters added) and Lisinopril resumed by primary   Monitor BP     PAF  BB, ASA  Resume home Eliquis when ok with cardiology    Chronic systolic heart failure/ischemic cardiomyopathy  Home Tikosyn, lasix, Lisinopril, BB, Aldactone resumed by primary  Los sodium diet with fluid restriction       Consultations:   [facility], KELSEY - CNP  12/8/2022  5:30 PM    Copy sent to Dr. Alfreda White MD

## 2022-12-08 NOTE — BRIEF OP NOTE
Brief Postoperative Note      [facility]  [address] [facility]  [place] [place]  [phone]          Cardiac Catheterization       Mr. Zaragoza  YOB: 1945   [id number]      Pre-operative Diagnosis: sob    Post-operative Diagnosis: sob    Informed consent was obtained from the patient after explanation of the procedure including indications, description of the procedure, benefits and possible risks and complications of the procedure, and alternatives. Questions were answered.  The patient's history was reviewed and a directed physical examination was performed prior to the procedure    PORFIRIO:   Mild moderate LV dysfunction, EF 40-45%  Moderate MR  Full report in computer    Procedure: LHC, LVgram, Coronary angiography, PORFIRIO     LV gram - edp ok   Mild pulmonary HTN     Cors    LM - mild     LAD - 100    LCF - 100    RCA - mid 95%, LULU placed and post dilated with 3.0 NC balloon    LIMA to LAD  SVG to OM  Both patent        Anesthesia: conscious sedation    Surgeons/Assistants: [place]    Estimated Blood Loss: Minimal    Complications: None      Recommendations DAPT, monitor overnight        TWILA Gomes MD, [facility]  12/8/2022 1:37 PM

## 2022-12-09 VITALS
HEIGHT: 73 IN | WEIGHT: 175 LBS | BODY MASS INDEX: 23.19 KG/M2 | TEMPERATURE: 97.7 F | DIASTOLIC BLOOD PRESSURE: 76 MMHG | RESPIRATION RATE: 16 BRPM | HEART RATE: 42 BPM | SYSTOLIC BLOOD PRESSURE: 148 MMHG | OXYGEN SATURATION: 94 %

## 2022-12-09 LAB
ANION GAP SERPL CALCULATED.3IONS-SCNC: 9 MMOL/L (ref 9–17)
BUN BLDV-MCNC: 19 MG/DL (ref 8–23)
BUN/CREAT BLD: 26 (ref 9–20)
CALCIUM SERPL-MCNC: 8.7 MG/DL (ref 8.6–10.4)
CHLORIDE BLD-SCNC: 105 MMOL/L (ref 98–107)
CO2: 25 MMOL/L (ref 20–31)
CREAT SERPL-MCNC: 0.73 MG/DL (ref 0.7–1.2)
GFR SERPL CREATININE-BSD FRML MDRD: >60 ML/MIN/1.73M2
GLUCOSE BLD-MCNC: 93 MG/DL (ref 70–99)
HCT VFR BLD CALC: 39.2 % (ref 40.7–50.3)
HEMOGLOBIN: 12 G/DL (ref 13–17)
MCH RBC QN AUTO: 24.3 PG (ref 25.2–33.5)
MCHC RBC AUTO-ENTMCNC: 30.6 G/DL (ref 28.4–34.8)
MCV RBC AUTO: 79.4 FL (ref 82.6–102.9)
NRBC AUTOMATED: 0 PER 100 WBC
PDW BLD-RTO: 15.6 % (ref 11.8–14.4)
PLATELET # BLD: 231 K/UL (ref 138–453)
PMV BLD AUTO: 12.4 FL (ref 8.1–13.5)
POTASSIUM SERPL-SCNC: 4.3 MMOL/L (ref 3.7–5.3)
RBC # BLD: 4.94 M/UL (ref 4.21–5.77)
SODIUM BLD-SCNC: 139 MMOL/L (ref 135–144)
WBC # BLD: 9.8 K/UL (ref 3.5–11.3)

## 2022-12-09 PROCEDURE — 85027 COMPLETE CBC AUTOMATED: CPT

## 2022-12-09 PROCEDURE — 99225 PR SBSQ OBSERVATION CARE/DAY 25 MINUTES: CPT | Performed by: INTERNAL MEDICINE

## 2022-12-09 PROCEDURE — 80048 BASIC METABOLIC PNL TOTAL CA: CPT

## 2022-12-09 PROCEDURE — 6370000000 HC RX 637 (ALT 250 FOR IP): Performed by: INTERNAL MEDICINE

## 2022-12-09 PROCEDURE — 36415 COLL VENOUS BLD VENIPUNCTURE: CPT

## 2022-12-09 PROCEDURE — 94761 N-INVAS EAR/PLS OXIMETRY MLT: CPT

## 2022-12-09 RX ORDER — ATROPINE SULFATE 0.1 MG/ML
0.5 INJECTION INTRAVENOUS PRN
Status: DISCONTINUED | OUTPATIENT
Start: 2022-12-09 | End: 2022-12-09 | Stop reason: HOSPADM

## 2022-12-09 RX ORDER — CLOPIDOGREL BISULFATE 75 MG/1
75 TABLET ORAL DAILY
Qty: 30 TABLET | Refills: 3 | Status: SHIPPED | OUTPATIENT
Start: 2022-12-09

## 2022-12-09 RX ORDER — ATROPINE SULFATE 0.1 MG/ML
0.5 INJECTION INTRAVENOUS ONCE
Status: DISCONTINUED | OUTPATIENT
Start: 2022-12-09 | End: 2022-12-09

## 2022-12-09 RX ORDER — METOPROLOL SUCCINATE 50 MG/1
100 TABLET, EXTENDED RELEASE ORAL DAILY
Status: DISCONTINUED | OUTPATIENT
Start: 2022-12-10 | End: 2022-12-09 | Stop reason: HOSPADM

## 2022-12-09 RX ORDER — ASPIRIN 81 MG/1
81 TABLET, CHEWABLE ORAL DAILY
Qty: 30 TABLET | Refills: 3 | Status: SHIPPED | OUTPATIENT
Start: 2022-12-09

## 2022-12-09 RX ADMIN — ASPIRIN 81 MG CHEWABLE TABLET 81 MG: 81 TABLET CHEWABLE at 10:05

## 2022-12-09 RX ADMIN — GLIPIZIDE 5 MG: 10 TABLET ORAL at 07:30

## 2022-12-09 RX ADMIN — CLOPIDOGREL BISULFATE 75 MG: 75 TABLET ORAL at 10:05

## 2022-12-09 NOTE — PROGRESS NOTES
[facility])    Progress Note    2022 7:49 AM      Subjective:  Mr. Stephania Mart  feels good  No cp,sob or palpitations  Groin site looks good  Rhythm stable             LABS:     CBC:   Recent Labs     22  1000 22  0340   WBC 9.6 9.8   HGB 13.0 12.0*   HCT 41.4 39.2*   MCV 77.2* 79.4*    231     BMP:   Recent Labs     22  1000 22  0340    139   K 4.7 4.3    105   CO2 24 25   BUN 24* 19   CREATININE 0.78 0.73     PT/INR: No results for input(s): PROTIME, INR in the last 72 hours. APTT: No results for input(s): APTT in the last 72 hours. MAG: No results for input(s): MG in the last 72 hours. D Dimer: No results for input(s): DDIMER in the last 72 hours. Troponin T No results for input(s): TROPONINT in the last 72 hours. Pro-BNP No results for input(s): BNP in the last 72 hours. Pulse Ox:  SpO2  Av.3 %  Min: 90 %  Max: 98 %  Supplemental O2:       Current Meds: atropine injection 0.5 mg, PRN  apixaban (ELIQUIS) tablet 5 mg, BID  0.9 % sodium chloride infusion, Continuous  dofetilide (TIKOSYN) capsule 250 mcg, Q12H  finasteride (PROSCAR) tablet 5 mg, Daily  furosemide (LASIX) tablet 40 mg, Daily  glipiZIDE (GLUCOTROL) tablet 5 mg, QAM AC  lisinopril (PRINIVIL;ZESTRIL) tablet 20 mg, Daily  metoprolol succinate (TOPROL XL) extended release tablet 200 mg, Daily  spironolactone (ALDACTONE) pre-split tablet 12.5 mg, Daily  tamsulosin (FLOMAX) capsule 0.4 mg, Daily  atorvastatin (LIPITOR) tablet 20 mg, Daily  sodium chloride flush 0.9 % injection 5-40 mL, 2 times per day  sodium chloride flush 0.9 % injection 5-40 mL, PRN  0.9 % sodium chloride infusion, PRN  acetaminophen (TYLENOL) tablet 650 mg, Q4H PRN  ondansetron (ZOFRAN) injection 4 mg, Q6H PRN  aspirin chewable tablet 81 mg, Daily  clopidogrel (PLAVIX) tablet 75 mg, Daily  glucose chewable tablet 16 g, PRN  dextrose bolus 10% 125 mL, PRN   Or  dextrose bolus 10% 250 mL, PRN  glucagon (rDNA) injection 1 mg, PRN  dextrose 10 % infusion, Continuous PRN    Continuous Infusions:    sodium chloride 75 mL/hr at 12/08/22 1030    sodium chloride      dextrose            VITAL SIGNS:    BP (!) 151/73   Pulse (!) 49   Temp 97.5 °F (36.4 °C) (Temporal)   Resp 17   Ht 6' 1\" (1.854 m)   Wt 175 lb (79.4 kg)   SpO2 96%   BMI 23.09 kg/m²         Admit Weight:  175 lb (79.4 kg)    Last 3 weights: Wt Readings from Last 3 Encounters:   12/08/22 175 lb (79.4 kg)   10/16/18 204 lb 5 oz (92.7 kg)       BMI: Body mass index is 23.09 kg/m². INPUT/OUTPUT:        Intake/Output Summary (Last 24 hours) at 12/9/2022 0749  Last data filed at 12/8/2022 1930  Gross per 24 hour   Intake 217.97 ml   Output 300 ml   Net -82.03 ml       EKG:     EXAM:     General appearance: In no acute distress. Lungs: Clear to Auscultation bilaterally  Heart: regular  Abdomen: Soft nontender  Extremities: No edema  Psych:  Appropriate mood and affect. Awake, alert and oriented x 3.    Other:    Principal Problem: Atherosclerosis of native coronary artery of native heart  Active Problems:    Type 2 diabetes mellitus (HCC)    Hyperlipemia    Hypertension    Paroxysmal atrial fibrillation (HCC)    Ischemic cardiomyopathy    Chronic systolic heart failure ([address]  Resolved Problems:    * No resolved hospital problems.  *      ASSESSMENT / PLAN  LULU of RCA  Patent LIMA to LAD, SVG to OM  Moderate MR  EF 40-45  PAF    Resume eliquis  Needs plavix and asa    After one month can drop asa, continue plavix and eliquis    Ok to d/c    Electronically signed by Cathryn Wilson MD on 12/9/2022 at 7:49 AM

## 2022-12-09 NOTE — CARE COORDINATION
12/09/22 0925   Service Assessment   Patient Orientation Alert and Oriented   Cognition Alert   History Provided By Patient;Significant Other   Primary Caregiver Self   Accompanied By/Relationship s.o. [address] Spouse/Significant Other;Children   PCP Verified by CM Yes  (3 mos ago)   Last Visit to PCP Within last 3 months   Prior Functional Level Independent in ADLs/IADLs   Current Functional Level Independent in ADLs/IADLs   Can patient return to prior living arrangement Yes   Ability to make needs known: Good   Family able to assist with home care needs: Yes   Financial Resources Medicare [facility] Medicare)   Social/Functional History   Lives With Significant other   Type of [address] One level   Home Access Stairs to enter with rails   Entrance Stairs - Number of Steps 2   Bathroom Shower/Tub Tub/Shower unit; [address], standard;Cane   ADL Assistance Independent   Homemaking Assistance Independent   Ambulation Assistance Independent   Active  Yes   Occupation Retired   Discharge Planning   Type of [facility] Spouse/Significant Other   Current Services Prior To Admission [address] Needed N/A   DME Ordered? No   Type of Home Care Services None   One/Two Story Residence One story   History of falls? 0   Services At/After Discharge   Services At/After Discharge None   Confirm Follow Up Transport Family  Lucila Narayanan)       Pt is independent, drives, lives with spouse. Declines dc needs.      Home address [address]

## 2022-12-09 NOTE — PROGRESS NOTES
[facility]  Office: [address], DO, Aura Perkins, DO, Giulia Blood, DO, Mona Glover Blood, DO, Marium Fields MD, Vini House MD, Liseth Romero MD, Jacqueline Agudelo MD,  Monico Woodard MD, Griselda Farah MD, Billie Cobb, DO, Roma Wilkerson MD,  Girish Wright MD, Rhina Tobar MD, Leola Owusu, DO, Jesika Pearl MD, Niki Chavez MD, Sawyer Morales, DO, Sb Xiong MD, Rosa Riggs MD, Juan Lau MD, Charline Garcia MD, Edel Noriega, DO, Tiara Cantu MD, Lolita Chen MD, Jose Reis, Tracy Christie, CNP, Lissette Buckner, CNP, Santo Brothers, CNP,  Yulia Schrader, [facility], Parag Ritchie, CNP, Pia Jauregui, CNP, Jeannie Vasquez, CNP, Odell Palmer, CNP, Helen Martin, CNP, Aster Harris PA-C, Daphne Johnston, CNS, Javid Boswell, CNP, Anthony Oliveros, CNP         [facility]    Progress Note    12/9/2022    11:46 AM    Name:   Milagros Noland  MRN:     [id number]     Acct:      [de-identified]   Room:   2034/2034-01   Day:  0  Admit Date:  12/8/2022  9:56 AM    PCP:   Kirit Fishman MD  Code Status:  Full Code    Subjective:     C/C: Exertional dyspnea    Interval History Status: improved. Patient is resting calmly, complete heart catheterization with stent to the RCA. Denies any chest pain, nausea vomiting, fevers or chills, shortness of breath or other acute complaints. Brief History: This is a 67-year-old male with known history of coronary disease that presents for outpatient heart catheterization for ongoing symptoms of exertional dyspnea. He was found to have high-grade stenosis of the RCA and underwent stent placement.   We have been asked to follow for postop medical management diabetes, hypertension etc.    Review of Systems:     Constitutional:  negative for chills, fevers, sweats  Respiratory:  negative for cough, dyspnea on exertion, shortness of breath, wheezing  Cardiovascular:  negative for chest pain, chest pressure/discomfort, lower extremity edema, palpitations  Gastrointestinal:  negative for abdominal pain, constipation, diarrhea, nausea, vomiting  Neurological:  negative for dizziness, headache    Medications: Allergies:  No Known Allergies    Current Meds:   Scheduled Meds:    apixaban  5 mg Oral BID    [START ON 12/10/2022] metoprolol succinate  100 mg Oral Daily    dofetilide  250 mcg Oral Q12H    finasteride  5 mg Oral Daily    furosemide  40 mg Oral Daily    glipiZIDE  5 mg Oral QAM AC    lisinopril  20 mg Oral Daily    spironolactone  12.5 mg Oral Daily    tamsulosin  0.4 mg Oral Daily    atorvastatin  20 mg Oral Daily    sodium chloride flush  5-40 mL IntraVENous 2 times per day    aspirin  81 mg Oral Daily    clopidogrel  75 mg Oral Daily     Continuous Infusions:    sodium chloride Stopped (12/08/22 2200)    sodium chloride      dextrose       PRN Meds: atropine, sodium chloride flush, sodium chloride, acetaminophen, ondansetron, glucose, dextrose bolus **OR** dextrose bolus, glucagon (rDNA), dextrose    Data:     Past Medical History:   has a past medical history of Acute systolic (congestive) heart failure ([address] Arthritis, Atrial flutter ([address] Blindness of left eye, CAD (coronary artery disease), Diabetes mellitus ([address] GERD (gastroesophageal reflux disease), Heart attack ([address] Hyperlipidemia, Hypertension, Kidney stone, Mitral valve regurgitation, Paroxysmal atrial fibrillation ([address] Severe mitral insufficiency, and SOB (shortness of breath). Social History:   reports that he quit smoking about 22 years ago. His smoking use included cigarettes. He smoked an average of 1 pack per day. He has never used smokeless tobacco. He reports current alcohol use. He reports that he does not use drugs. Family History: History reviewed. No pertinent family history.     Vitals:  BP (!) 148/76   Pulse (!) 42   Temp 97.7 °F (36.5 °C) (Temporal) Resp 16   Ht 6' 1\" (1.854 m)   Wt 175 lb (79.4 kg)   SpO2 94%   BMI 23.09 kg/m²   Temp (24hrs), Av.7 °F (36.5 °C), Min:97.1 °F (36.2 °C), Max:98.3 °F (36.8 °C)    No results for input(s): POCGLU in the last 72 hours. I/O (24Hr): Intake/Output Summary (Last 24 hours) at 2022 1146  Last data filed at 2022 1930  Gross per 24 hour   Intake 217.97 ml   Output 300 ml   Net -82.03 ml       Labs:  Hematology:  Recent Labs     22  1000 22  0340   WBC 9.6 9.8   RBC 5.36 4.94   HGB 13.0 12.0*   HCT 41.4 39.2*   MCV 77.2* 79.4*   MCH 24.3* 24.3*   MCHC 31.4 30.6   RDW 15.6* 15.6*    231   MPV 12.1 12.4     Chemistry:  Recent Labs     22  1000 22  0340    139   K 4.7 4.3    105   CO2 24 25   GLUCOSE 119* 93   BUN 24* 19   CREATININE 0.78 0.73   ANIONGAP 11 9   LABGLOM >60 >60   CALCIUM 8.9 8.7     Recent Labs     22  1441   CHOL 96   HDL 32*   LDLCHOLESTEROL 46   CHOLHDLRATIO 3.0   TRIG 90     ABG:No results found for: POCPH, PHART, PH, POCPCO2, SDA4XDA, PCO2, POCPO2, PO2ART, PO2, POCHCO3, SLV8SWN, HCO3, NBEA, PBEA, BEART, BE, THGBART, THB, LOA6DYX, SHPU1BKK, Y0OLUWXZ, O2SAT, FIO2  No results found for: SPECIAL  No results found for: CULTURE    Radiology:  No results found.     Physical Examination:        General appearance:  alert, cooperative and no distress  Mental Status:  oriented to person, place and time and normal affect  Lungs:  clear to auscultation bilaterally, normal effort  Heart:  regular rate and rhythm, no murmur  Abdomen:  soft, nontender, nondistended, normal bowel sounds, no masses, hepatomegaly, splenomegaly  Extremities:  no edema, redness, tenderness in the calves  Skin:  no gross lesions, rashes, induration    Assessment:        Hospital Problems             Last Modified POA    * (Principal) Atherosclerosis of native coronary artery of native heart 2022 Yes    Type 2 diabetes mellitus ([facility] 2022 Yes    Dyslipidemia 12/9/2022 Yes    Primary hypertension 12/9/2022 Yes    Overview Signed 12/8/2022  5:20 PM by KELSEY Arreola CNP     1998         Paroxysmal atrial fibrillation ([facility] 12/8/2022 Yes    Ischemic cardiomyopathy 12/8/2022 Yes    Chronic systolic heart failure ([facility] (Chronic) 12/8/2022 Yes       Plan:        Continue home maintenance medications  Insulin scale while hospitalized  GI and DVT prophylaxis  Antihypertensives as ordered  Activity as tolerated  Medically stable for discharge    Markus Gaines DO  12/9/2022  11:46 AM

## 2022-12-09 NOTE — DISCHARGE INSTR - COC
Continuity of Care Form    Patient Name: Jewel Gaona   :  1945  MRN:  [id number]    Admit date:  2022  Discharge date:  ***    Code Status Order: Full Code   Advance Directives:     Admitting Physician:  Angela Trinh MD  PCP: Renae Tabares MD    Discharging Nurse: [facility] Unit/Room#: 2034/4-01  Discharging Unit Phone Number: ***    Emergency Contact:   Extended Emergency Contact Information  Primary Emergency Contact: Zach Blanc [address] Phone: [phone]  Mobile Phone: [phone]  Relation: Child  Secondary Emergency Contact: kacey oden  Home Phone: [phone]  Relation: Domestic Partner    Past Surgical History:  Past Surgical History:   Procedure Laterality Date    ANKLE FUSION Right     APPENDECTOMY      COLONOSCOPY      CORONARY ANGIOPLASTY WITH STENT PLACEMENT      CORONARY ARTERY BYPASS GRAFT      3 vessels    EYE SURGERY Left     GASTRIC BYPASS SURGERY      LITHOTRIPSY  10/16/2018    ESWL EXTRACORPEAL SHOCK WAVE LITHOTRIPSY LEFT WITH CYSTO AND LEFT STENT REMOVAL   NEXMED (Left     AR FRAGMENT KIDNEY STONE/ ESWL Left 10/16/2018    ESWL EXTRACORPEAL SHOCK WAVE LITHOTRIPSY LEFT WITH CYSTO AND LEFT STENT REMOVAL   NEXMED performed by Demi Diop MD at [place] [address]         Immunization History: There is no immunization history on file for this patient.     Active Problems:  Patient Active Problem List   Diagnosis Code    Atherosclerosis of native coronary artery of native heart I25.10    Type 2 diabetes mellitus ([facility] E11.9    Hyperlipemia E78.5    Hypertension I10    Paroxysmal atrial fibrillation (HCC) I48.0    Status post three vessel coronary artery bypass Z95.1    Ischemic cardiomyopathy I25.5    Chronic systolic heart failure (HCC) I50.22       Isolation/Infection:   Isolation            No Isolation          Patient Infection Status       None to display            Nurse Assessment:  Last Vital Signs: BP (!) 148/76   Pulse (!) 42   Temp 97.7 °F (36.5 °C) (Temporal)   Resp 16   Ht 6' 1\" (1.854 m)   Wt 175 lb (79.4 kg)   SpO2 94%   BMI 23.09 kg/m²     Last documented pain score (0-10 scale): Pain Level: 0  Last Weight:   Wt Readings from Last 1 Encounters:   12/08/22 175 lb (79.4 kg)     Mental Status:  {IP PT MENTAL STATUS:20030}    IV Access:  { ASAEL IV ACCESS:667052656}    Nursing Mobility/ADLs:  Walking   {CHP DME ITEV:543536077}  Transfer  {CHP DME VJTH:573405843}  Bathing  {CHP DME BNBK:598658131}  Dressing  {CHP DME UJQL:640717887}  Toileting  {CHP DME CKXA:210894146}  Feeding  {P DME CFKL:924881328}  Med Admin  {[facility] DME KMLO:163991533}  Med Delivery   { ASAEL MED Delivery:444982899}    Wound Care Documentation and Therapy:        Elimination:  Continence: Bowel: {YES / EO:72224}  Bladder: {YES / ZU:22899}  Urinary Catheter: {Urinary Catheter:183035647}   Colostomy/Ileostomy/Ileal Conduit: {YES / MR:05565}       Date of Last BM: ***    Intake/Output Summary (Last 24 hours) at 12/9/2022 1000  Last data filed at 12/8/2022 1930  Gross per 24 hour   Intake 217.97 ml   Output 300 ml   Net -82.03 ml     I/O last 3 completed shifts:   In: 26 [I.V.:218]  Out: 300 [Urine:300]    Safety Concerns:     [employer] Safety Concerns:040381781}    Impairments/Disabilities:      [employer] Impairments/Disabilities:324252014}    Nutrition Therapy:  Current Nutrition Therapy:   [employer] Diet List:667024380}    Routes of Feeding: {CHP DME Other Feedings:175267549}  Liquids: {Slp liquid thickness:70962}  Daily Fluid Restriction: {CHP DME Yes amt example:669565698}  Last Modified Barium Swallow with Video (Video Swallowing Test): {Done Not Done CZZE:147390739}    Treatments at the Time of Hospital Discharge:   Respiratory Treatments: ***  Oxygen Therapy:  {Therapy; copd oxygen:16719}  Ventilator:    {MH CC Vent CCTQ:986153269}    Rehab Therapies: {THERAPEUTIC INTERVENTION:0820446111}  Weight Bearing Status/Restrictions: [address] [employer] CC Weight Bearin}  Other Medical Equipment (for information only, NOT a DME order):  {EQUIPMENT:277684094}  Other Treatments: ***    Patient's personal belongings (please select all that are sent with patient):  {CHP DME Belongings:255922295}    RN SIGNATURE:  {Esignature:261932509}    CASE MANAGEMENT/SOCIAL WORK SECTION    Inpatient Status Date: ***    Readmission Risk Assessment Score:  Readmission Risk              Risk of Unplanned Readmission:  0           Discharging to Facility/ Agency   Name:   Address:  Phone:  Fax:    Dialysis Facility (if applicable)   Name:  Address:  Dialysis Schedule:  Phone:  Fax:    / signature: {Esignature:202119309}    PHYSICIAN SECTION    Prognosis: {Prognosis:6793951105}    Condition at Discharge: [address] [place] [address] Patient Condition:016160627}    Rehab Potential (if transferring to Rehab): {Prognosis:2219058538}    Recommended Labs or Other Treatments After Discharge: ***    Physician Certification: I certify the above information and transfer of Elmon Coffee  is necessary for the continuing treatment of the diagnosis listed and that he requires {Admit to Appropriate Level of Care:65217} for {GREATER/LESS:438131947} 30 days.      Update Admission H&P: {CHP DME Changes in WAKSV:744802933}    PHYSICIAN SIGNATURE:  {Esignature:978090260}

## 2022-12-09 NOTE — FLOWSHEET NOTE
12/09/22 0530   Treatment Team Notification   Reason for Communication Evaluate; Review case   Team Member Name CARLEE Potter   Treatment Team Role Advanced Practice Nurse   Method of Communication Call   Response See orders   Notification Time 0530       Paged concerning parameters for atropine. Discussed Patient's increase in frequency of \"blips\" with the longest being 2.86s.  CARLEE Potter states she is not concerned about it and to only give atropine with symptomatic bradycardia

## 2022-12-09 NOTE — PLAN OF CARE
Problem: Chronic Conditions and Co-morbidities  Goal: Patient's chronic conditions and co-morbidity symptoms are monitored and maintained or improved  Outcome: Progressing     Problem: Discharge Planning  Goal: Discharge to home or other facility with appropriate resources  Outcome: Progressing     Problem: Pain  Goal: Verbalizes/displays adequate comfort level or baseline comfort level  Outcome: Progressing     Problem: Cardiovascular - Adult  Goal: Maintains optimal cardiac output and hemodynamic stability  Outcome: Progressing  Goal: Absence of cardiac dysrhythmias or at baseline  Outcome: Progressing

## 2022-12-09 NOTE — PROGRESS NOTES
[facility]  PROGRESS NOTE    Room # 2034/2034-01   Name: Neha Hess            Uatsdin: none     Reason for visit: Routine    I visited the patient. Admit Date & Time: 12/8/2022  9:56 AM    Assessment:  Neha Hess is a 65 y.o. male in the hospital because \"atherosclerosis\". Upon entering the room pt was lying in bed. Pt's wife standing bedside      Intervention:  I introduced myself and my title as  I offered space for pt and spouse  to express feelings, needs, and concerns and provided a ministry presence. Pt stated that he is probably being discharged. Pt appeared to be coping and optimistic. Pt and pt's wife did not express any needs or concerns to this writer. Outcome:  Pt expressed gratitude for visit    Plan:  Chaplains will remain available to offer spiritual and emotional support as needed.     Electronically signed by Christopher Cooper on 12/9/2022 at 9:14 AM.  Chad       12/09/22 7028   Encounter Summary   Service Provided For: Patient and family together   Referral/Consult From: [facility]   Support System Spouse   Last Encounter  12/09/22   Complexity of Encounter Low   Begin Time 0905   End Time  0912   Total Time Calculated 7 min   Encounter    Type Initial Screen/Assessment   Assessment/Intervention/Outcome   Assessment Calm;Coping   Intervention Active listening;Sustaining Presence/Ministry of presence   Outcome Engaged in conversation;Encouraged;Expressed Gratitude;Receptive;Coping

## 2022-12-09 NOTE — ACP (ADVANCE CARE PLANNING)
Advance Care Planning     Advance Care Planning Activator (Inpatient)  Conversation Note      Date of ACP Conversation: 12/9/2022     Conversation Conducted with: Patient with Decision Making Capacity    ACP Activator: Eve Hudson RN        Health Care Decision Maker: self     Current Designated Health Care Decision Maker: self     Click here to complete Healthcare Decision Makers including section of the Healthcare Decision Maker Relationship (ie \"Primary\")  Today we documented Decision Maker(s). The patient will provide ACP documents. Care Preferences    Ventilation: \"If you were in your present state of health and suddenly became very ill and were unable to breathe on your own, what would your preference be about the use of a ventilator (breathing machine) if it were available to you? \"      Would the patient desire the use of ventilator (breathing machine)?: yes      Length of ACP Conversation in minutes:  10    Conversation Outcomes:  [x] ACP discussion completed  [] Existing advance directive reviewed with patient; no changes to patient's previously recorded wishes  [] New Advance Directive completed  [] Portable Do Not Rescitate prepared for Provider review and signature  [] POLST/POST/MOLST/MOST prepared for Provider review and signature      Follow-up plan:    [] Schedule follow-up conversation to continue planning  [x] Referred individual to Provider for additional questions/concerns   [] Advised patient/agent/surrogate to review completed ACP document and update if needed with changes in condition, patient preferences or care setting    [] This note routed to one or more involved healthcare providers

## 2022-12-09 NOTE — PROGRESS NOTES
End Of Shift Note  [facility]  Summary of shift: At start of shift, Patient's heart rhythm read as sinus buster with infrequent pauses < 3 seconds with a rate of 40-60s. His EKG from 12/8 showed NSR. As the night went on, his rhythm went into afib and would drop to 30bpm for one beat and return to 50s. NP [place] is not concerned and prescribed atropine for symptomatic bradycardia only.        Vitals:    Vitals:    12/09/22 0100 12/09/22 0104 12/09/22 0200 12/09/22 0400   BP: (!) 155/77  (!) 154/77    Pulse: 58 55 59    Resp: 16 15 18    Temp:    97.5 °F (36.4 °C)   TempSrc:    Temporal   SpO2:  96%     Weight:       Height:            I&O:   Intake/Output Summary (Last 24 hours) at 12/9/2022 0528  Last data filed at 12/8/2022 1930  Gross per 24 hour   Intake 217.97 ml   Output 300 ml   Net -82.03 ml       Resp Status: RA    Ventilator Settings:     / / /     Critical Care IV infusions:  0.9% NS at 50ml/hr stopped at 2300 after finishing 1L of fluids     LDA:   Peripheral IV 12/08/22 Left;Proximal Forearm (Active)   Number of days: 0

## 2022-12-09 NOTE — PROGRESS NOTES
Discharge Note:      All discharge instructions given at this time as well as all patient belongings returned to patient. Pt denies any further questions regarding discharge at this time. Pt given also given discharge packet with unit letter, discharge instructions/restrictions and medication handouts regarding all discharge medications and side effects. Pt denies any further issues at this time. Pt ambulated out to front discharge doors at this time. Pt left premises without any issues in private vehicle at this time.

## 2022-12-09 NOTE — FLOWSHEET NOTE
12/09/22 0150   Treatment Team Notification   Reason for Communication Evaluate; Review case   Team Member Name NP STEVO Potter   Treatment Team Role Advanced Practice Nurse   Method of Communication Call   Response See orders   Notification Time [phone]     CARLEE Potter, on call for Dr. Shruthi Myrick, notified of Patient's HR dropping to 35s. NP advised to place fast patches on patient and initiate ACLS protocol. Fast patches applied and code cart outside of patient's room.

## 2022-12-10 LAB
EKG ATRIAL RATE: 61 BPM
EKG P AXIS: 4 DEGREES
EKG P-R INTERVAL: 122 MS
EKG Q-T INTERVAL: 494 MS
EKG QRS DURATION: 96 MS
EKG QTC CALCULATION (BAZETT): 497 MS
EKG R AXIS: 18 DEGREES
EKG T AXIS: 108 DEGREES
EKG VENTRICULAR RATE: 61 BPM

## 2022-12-10 PROCEDURE — 93010 ELECTROCARDIOGRAM REPORT: CPT | Performed by: INTERNAL MEDICINE

## 2023-03-16 ENCOUNTER — HOSPITAL ENCOUNTER (INPATIENT)
Age: 78
LOS: 2 days | Discharge: HOME | DRG: 309 | End: 2023-03-18
Attending: FAMILY MEDICINE | Admitting: FAMILY MEDICINE
Payer: MEDICARE

## 2023-03-16 DIAGNOSIS — R00.1 BRADYCARDIA: Primary | ICD-10-CM

## 2023-03-16 DIAGNOSIS — I48.0 PAF (PAROXYSMAL ATRIAL FIBRILLATION) (HCC): ICD-10-CM

## 2023-03-16 LAB
BASOPHILS ABSOLUTE: 0.1 THOU/MM3 (ref 0–0.1)
BASOPHILS NFR BLD AUTO: 0.7 %
DEPRECATED RDW RBC AUTO: 45 FL (ref 35–45)
EOSINOPHIL NFR BLD AUTO: 1.6 %
EOSINOPHILS ABSOLUTE: 0.2 THOU/MM3 (ref 0–0.4)
ERYTHROCYTE [DISTWIDTH] IN BLOOD BY AUTOMATED COUNT: 15.5 % (ref 11.5–14.5)
HCT VFR BLD AUTO: 37.5 % (ref 42–52)
HGB BLD-MCNC: 11.5 GM/DL (ref 14–18)
IMM GRANULOCYTES # BLD AUTO: 0.02 THOU/MM3 (ref 0–0.07)
IMM GRANULOCYTES NFR BLD AUTO: 0.2 %
LYMPHOCYTES ABSOLUTE: 1.8 THOU/MM3 (ref 1–4.8)
LYMPHOCYTES NFR BLD AUTO: 18.4 %
MCH RBC QN AUTO: 24.7 PG (ref 26–33)
MCHC RBC AUTO-ENTMCNC: 30.7 GM/DL (ref 32.2–35.5)
MCV RBC AUTO: 80.5 FL (ref 80–94)
MONOCYTES ABSOLUTE: 1.1 THOU/MM3 (ref 0.4–1.3)
MONOCYTES NFR BLD AUTO: 10.9 %
NEUTROPHILS NFR BLD AUTO: 68.2 %
NRBC BLD AUTO-RTO: 0 /100 WBC
PLATELET # BLD AUTO: 208 THOU/MM3 (ref 130–400)
PMV BLD AUTO: 12.9 FL (ref 9.4–12.4)
RBC # BLD AUTO: 4.66 MILL/MM3 (ref 4.7–6.1)
SEGMENTED NEUTROPHILS ABSOLUTE COUNT: 6.8 THOU/MM3 (ref 1.8–7.7)
WBC # BLD AUTO: 10 THOU/MM3 (ref 4.8–10.8)

## 2023-03-16 PROCEDURE — 36415 COLL VENOUS BLD VENIPUNCTURE: CPT

## 2023-03-16 PROCEDURE — 6370000000 HC RX 637 (ALT 250 FOR IP): Performed by: STUDENT IN AN ORGANIZED HEALTH CARE EDUCATION/TRAINING PROGRAM

## 2023-03-16 PROCEDURE — 1200000003 HC TELEMETRY R&B

## 2023-03-16 PROCEDURE — 85025 COMPLETE CBC W/AUTO DIFF WBC: CPT

## 2023-03-16 PROCEDURE — 2580000003 HC RX 258: Performed by: STUDENT IN AN ORGANIZED HEALTH CARE EDUCATION/TRAINING PROGRAM

## 2023-03-16 PROCEDURE — 93005 ELECTROCARDIOGRAM TRACING: CPT | Performed by: STUDENT IN AN ORGANIZED HEALTH CARE EDUCATION/TRAINING PROGRAM

## 2023-03-16 RX ORDER — LISINOPRIL 20 MG/1
20 TABLET ORAL DAILY
Status: DISCONTINUED | OUTPATIENT
Start: 2023-03-17 | End: 2023-03-18 | Stop reason: HOSPADM

## 2023-03-16 RX ORDER — SODIUM CHLORIDE 9 MG/ML
INJECTION, SOLUTION INTRAVENOUS PRN
Status: DISCONTINUED | OUTPATIENT
Start: 2023-03-16 | End: 2023-03-18 | Stop reason: HOSPADM

## 2023-03-16 RX ORDER — FINASTERIDE 5 MG/1
5 TABLET, FILM COATED ORAL DAILY
Status: DISCONTINUED | OUTPATIENT
Start: 2023-03-17 | End: 2023-03-18 | Stop reason: HOSPADM

## 2023-03-16 RX ORDER — SODIUM CHLORIDE 0.9 % (FLUSH) 0.9 %
5-40 SYRINGE (ML) INJECTION EVERY 12 HOURS SCHEDULED
Status: DISCONTINUED | OUTPATIENT
Start: 2023-03-16 | End: 2023-03-18 | Stop reason: HOSPADM

## 2023-03-16 RX ORDER — ACETAMINOPHEN 650 MG/1
650 SUPPOSITORY RECTAL EVERY 6 HOURS PRN
Status: DISCONTINUED | OUTPATIENT
Start: 2023-03-16 | End: 2023-03-18 | Stop reason: HOSPADM

## 2023-03-16 RX ORDER — LANOLIN ALCOHOL/MO/W.PET/CERES
1000 CREAM (GRAM) TOPICAL DAILY
Status: DISCONTINUED | OUTPATIENT
Start: 2023-03-17 | End: 2023-03-18 | Stop reason: HOSPADM

## 2023-03-16 RX ORDER — ATORVASTATIN CALCIUM 10 MG/1
10 TABLET, FILM COATED ORAL NIGHTLY
Status: DISCONTINUED | OUTPATIENT
Start: 2023-03-16 | End: 2023-03-18 | Stop reason: HOSPADM

## 2023-03-16 RX ORDER — FUROSEMIDE 40 MG/1
40 TABLET ORAL DAILY PRN
COMMUNITY

## 2023-03-16 RX ORDER — CLOPIDOGREL BISULFATE 75 MG/1
75 TABLET ORAL DAILY
Status: DISCONTINUED | OUTPATIENT
Start: 2023-03-17 | End: 2023-03-18 | Stop reason: HOSPADM

## 2023-03-16 RX ORDER — POLYETHYLENE GLYCOL 3350 17 G/17G
17 POWDER, FOR SOLUTION ORAL DAILY PRN
Status: DISCONTINUED | OUTPATIENT
Start: 2023-03-16 | End: 2023-03-18 | Stop reason: HOSPADM

## 2023-03-16 RX ORDER — ONDANSETRON 4 MG/1
4 TABLET, ORALLY DISINTEGRATING ORAL EVERY 8 HOURS PRN
Status: DISCONTINUED | OUTPATIENT
Start: 2023-03-16 | End: 2023-03-17

## 2023-03-16 RX ORDER — TAMSULOSIN HYDROCHLORIDE 0.4 MG/1
0.4 CAPSULE ORAL DAILY
Status: DISCONTINUED | OUTPATIENT
Start: 2023-03-17 | End: 2023-03-18 | Stop reason: HOSPADM

## 2023-03-16 RX ORDER — ONDANSETRON 2 MG/ML
4 INJECTION INTRAMUSCULAR; INTRAVENOUS EVERY 6 HOURS PRN
Status: DISCONTINUED | OUTPATIENT
Start: 2023-03-16 | End: 2023-03-17

## 2023-03-16 RX ORDER — SODIUM CHLORIDE 0.9 % (FLUSH) 0.9 %
5-40 SYRINGE (ML) INJECTION PRN
Status: DISCONTINUED | OUTPATIENT
Start: 2023-03-16 | End: 2023-03-18 | Stop reason: HOSPADM

## 2023-03-16 RX ORDER — ACETAMINOPHEN 325 MG/1
650 TABLET ORAL EVERY 6 HOURS PRN
Status: DISCONTINUED | OUTPATIENT
Start: 2023-03-16 | End: 2023-03-18 | Stop reason: HOSPADM

## 2023-03-16 RX ADMIN — SODIUM CHLORIDE, PRESERVATIVE FREE 10 ML: 5 INJECTION INTRAVENOUS at 21:38

## 2023-03-16 RX ADMIN — ACETAMINOPHEN 650 MG: 325 TABLET ORAL at 21:47

## 2023-03-16 RX ADMIN — APIXABAN 5 MG: 5 TABLET, FILM COATED ORAL at 21:38

## 2023-03-16 RX ADMIN — ATORVASTATIN CALCIUM 10 MG: 10 TABLET, FILM COATED ORAL at 21:38

## 2023-03-16 ASSESSMENT — PAIN SCALES - GENERAL
PAINLEVEL_OUTOF10: 0
PAINLEVEL_OUTOF10: 2
PAINLEVEL_OUTOF10: 0

## 2023-03-16 ASSESSMENT — PAIN DESCRIPTION - ONSET: ONSET: ON-GOING

## 2023-03-16 ASSESSMENT — PAIN DESCRIPTION - PAIN TYPE: TYPE: ACUTE PAIN

## 2023-03-16 ASSESSMENT — PAIN DESCRIPTION - LOCATION: LOCATION: HEAD

## 2023-03-16 ASSESSMENT — PAIN - FUNCTIONAL ASSESSMENT: PAIN_FUNCTIONAL_ASSESSMENT: ACTIVITIES ARE NOT PREVENTED

## 2023-03-16 ASSESSMENT — PAIN DESCRIPTION - DESCRIPTORS: DESCRIPTORS: ACHING

## 2023-03-16 ASSESSMENT — PAIN DESCRIPTION - ORIENTATION: ORIENTATION: MID

## 2023-03-16 ASSESSMENT — PAIN DESCRIPTION - FREQUENCY: FREQUENCY: CONTINUOUS

## 2023-03-16 NOTE — PROGRESS NOTES
Patient is a 65year old from [facility] with Dr Scar Jarrell transferring. Patient with history of CAD with coronary stent placement 1 month ago was admitted to them with bradycardia (sinus buster) in the 30's during cardiac rehab. Patient had been on beta blockers which have been held and HR is in the 60's currently, but patient has experienced sinus pauses so cardiology at [facility] suggests evaluation for a pacemaker. Patient has a cardiologist at [facility] in [facility], however this patient's insurance states that [facility] is the only [facility] is, which is why they are requesting transfer. All other workup reported as unremarkable, , including negative troponins.  Accepted in transfer to a tele bed under hospitalist

## 2023-03-17 LAB
ANION GAP SERPL CALC-SCNC: 10 MEQ/L (ref 8–16)
BASOPHILS ABSOLUTE: 0.1 THOU/MM3 (ref 0–0.1)
BASOPHILS NFR BLD AUTO: 1 %
BUN SERPL-MCNC: 20 MG/DL (ref 7–22)
CA-I BLD ISE-SCNC: 1.07 MMOL/L (ref 1.12–1.32)
CALCIUM SERPL-MCNC: 8.5 MG/DL (ref 8.5–10.5)
CHLORIDE SERPL-SCNC: 107 MEQ/L (ref 98–111)
CO2 SERPL-SCNC: 24 MEQ/L (ref 23–33)
CREAT SERPL-MCNC: 1 MG/DL (ref 0.4–1.2)
DEPRECATED RDW RBC AUTO: 44.1 FL (ref 35–45)
EKG ATRIAL RATE: 63 BPM
EKG P AXIS: 68 DEGREES
EKG P-R INTERVAL: 126 MS
EKG Q-T INTERVAL: 532 MS
EKG QRS DURATION: 94 MS
EKG QTC CALCULATION (BAZETT): 544 MS
EKG R AXIS: 29 DEGREES
EKG T AXIS: 103 DEGREES
EKG VENTRICULAR RATE: 63 BPM
EOSINOPHIL NFR BLD AUTO: 2.1 %
EOSINOPHILS ABSOLUTE: 0.1 THOU/MM3 (ref 0–0.4)
ERYTHROCYTE [DISTWIDTH] IN BLOOD BY AUTOMATED COUNT: 15.5 % (ref 11.5–14.5)
FERRITIN SERPL IA-MCNC: 43 NG/ML (ref 22–322)
FOLATE SERPL-MCNC: 14.6 NG/ML (ref 4.8–24.2)
GFR SERPL CREATININE-BSD FRML MDRD: > 60 ML/MIN/1.73M2
GLUCOSE BLD STRIP.AUTO-MCNC: 177 MG/DL (ref 70–108)
GLUCOSE BLD STRIP.AUTO-MCNC: 198 MG/DL (ref 70–108)
GLUCOSE BLD STRIP.AUTO-MCNC: 94 MG/DL (ref 70–108)
GLUCOSE SERPL-MCNC: 93 MG/DL (ref 70–108)
HCT VFR BLD AUTO: 34.9 % (ref 42–52)
HGB BLD-MCNC: 10.8 GM/DL (ref 14–18)
HGB RETIC QN AUTO: 27.6 PG (ref 28.2–35.7)
IMM GRANULOCYTES # BLD AUTO: 0.02 THOU/MM3 (ref 0–0.07)
IMM GRANULOCYTES NFR BLD AUTO: 0.3 %
IMM RETICS NFR: 6.9 % (ref 2.3–13.4)
IRON SATN MFR SERPL: 9 % (ref 20–50)
IRON SERPL-MCNC: 30 UG/DL (ref 65–195)
LV EF: 43 %
LVEF MODALITY: NORMAL
LYMPHOCYTES ABSOLUTE: 1.5 THOU/MM3 (ref 1–4.8)
LYMPHOCYTES NFR BLD AUTO: 21 %
MAGNESIUM SERPL-MCNC: 1.8 MG/DL (ref 1.6–2.4)
MCH RBC QN AUTO: 24.5 PG (ref 26–33)
MCHC RBC AUTO-ENTMCNC: 30.9 GM/DL (ref 32.2–35.5)
MCV RBC AUTO: 79.1 FL (ref 80–94)
MONOCYTES ABSOLUTE: 0.9 THOU/MM3 (ref 0.4–1.3)
MONOCYTES NFR BLD AUTO: 12.9 %
NEUTROPHILS NFR BLD AUTO: 62.7 %
NRBC BLD AUTO-RTO: 0 /100 WBC
PHOSPHATE SERPL-MCNC: 3.7 MG/DL (ref 2.4–4.7)
PLATELET # BLD AUTO: 203 THOU/MM3 (ref 130–400)
PMV BLD AUTO: 12.7 FL (ref 9.4–12.4)
POTASSIUM SERPL-SCNC: 4.2 MEQ/L (ref 3.5–5.2)
RBC # BLD AUTO: 4.41 MILL/MM3 (ref 4.7–6.1)
RETICS # AUTO: 40 THOU/MM3 (ref 20–115)
RETICS/RBC NFR AUTO: 0.9 % (ref 0.5–2)
REVIEWED BY: NORMAL
SEGMENTED NEUTROPHILS ABSOLUTE COUNT: 4.4 THOU/MM3 (ref 1.8–7.7)
SMEAR REVIEW: NORMAL
SODIUM SERPL-SCNC: 141 MEQ/L (ref 135–145)
T4 FREE SERPL-MCNC: 1.31 NG/DL (ref 0.93–1.76)
TIBC SERPL-MCNC: 325 UG/DL (ref 171–450)
TSH SERPL DL<=0.005 MIU/L-ACNC: 1.75 UIU/ML (ref 0.4–4.2)
VIT B12 SERPL-MCNC: > 2000 PG/ML (ref 211–911)
WBC # BLD AUTO: 7 THOU/MM3 (ref 4.8–10.8)

## 2023-03-17 PROCEDURE — 85025 COMPLETE CBC W/AUTO DIFF WBC: CPT

## 2023-03-17 PROCEDURE — 93306 TTE W/DOPPLER COMPLETE: CPT

## 2023-03-17 PROCEDURE — 6370000000 HC RX 637 (ALT 250 FOR IP): Performed by: STUDENT IN AN ORGANIZED HEALTH CARE EDUCATION/TRAINING PROGRAM

## 2023-03-17 PROCEDURE — 36415 COLL VENOUS BLD VENIPUNCTURE: CPT

## 2023-03-17 PROCEDURE — 83550 IRON BINDING TEST: CPT

## 2023-03-17 PROCEDURE — 84443 ASSAY THYROID STIM HORMONE: CPT

## 2023-03-17 PROCEDURE — 82330 ASSAY OF CALCIUM: CPT

## 2023-03-17 PROCEDURE — 2580000003 HC RX 258: Performed by: STUDENT IN AN ORGANIZED HEALTH CARE EDUCATION/TRAINING PROGRAM

## 2023-03-17 PROCEDURE — 99232 SBSQ HOSP IP/OBS MODERATE 35: CPT | Performed by: INTERNAL MEDICINE

## 2023-03-17 PROCEDURE — 84439 ASSAY OF FREE THYROXINE: CPT

## 2023-03-17 PROCEDURE — 82746 ASSAY OF FOLIC ACID SERUM: CPT

## 2023-03-17 PROCEDURE — 80048 BASIC METABOLIC PNL TOTAL CA: CPT

## 2023-03-17 PROCEDURE — 82607 VITAMIN B-12: CPT

## 2023-03-17 PROCEDURE — 82728 ASSAY OF FERRITIN: CPT

## 2023-03-17 PROCEDURE — 82948 REAGENT STRIP/BLOOD GLUCOSE: CPT

## 2023-03-17 PROCEDURE — 83735 ASSAY OF MAGNESIUM: CPT

## 2023-03-17 PROCEDURE — 93005 ELECTROCARDIOGRAM TRACING: CPT | Performed by: INTERNAL MEDICINE

## 2023-03-17 PROCEDURE — 6360000002 HC RX W HCPCS: Performed by: STUDENT IN AN ORGANIZED HEALTH CARE EDUCATION/TRAINING PROGRAM

## 2023-03-17 PROCEDURE — 1200000003 HC TELEMETRY R&B

## 2023-03-17 PROCEDURE — 84100 ASSAY OF PHOSPHORUS: CPT

## 2023-03-17 PROCEDURE — 83540 ASSAY OF IRON: CPT

## 2023-03-17 PROCEDURE — 85046 RETICYTE/HGB CONCENTRATE: CPT

## 2023-03-17 RX ORDER — CALCIUM GLUCONATE 20 MG/ML
2000 INJECTION, SOLUTION INTRAVENOUS ONCE
Status: COMPLETED | OUTPATIENT
Start: 2023-03-17 | End: 2023-03-17

## 2023-03-17 RX ORDER — METOPROLOL SUCCINATE 50 MG/1
50 TABLET, EXTENDED RELEASE ORAL DAILY
Status: DISCONTINUED | OUTPATIENT
Start: 2023-03-17 | End: 2023-03-17

## 2023-03-17 RX ORDER — DOFETILIDE 0.25 MG/1
250 CAPSULE ORAL EVERY 12 HOURS
Status: DISCONTINUED | OUTPATIENT
Start: 2023-03-17 | End: 2023-03-18 | Stop reason: HOSPADM

## 2023-03-17 RX ORDER — DEXTROSE MONOHYDRATE 100 MG/ML
INJECTION, SOLUTION INTRAVENOUS CONTINUOUS PRN
Status: DISCONTINUED | OUTPATIENT
Start: 2023-03-17 | End: 2023-03-18 | Stop reason: HOSPADM

## 2023-03-17 RX ORDER — INSULIN LISPRO 100 [IU]/ML
0-4 INJECTION, SOLUTION INTRAVENOUS; SUBCUTANEOUS NIGHTLY
Status: DISCONTINUED | OUTPATIENT
Start: 2023-03-17 | End: 2023-03-18 | Stop reason: HOSPADM

## 2023-03-17 RX ORDER — METOPROLOL SUCCINATE 25 MG/1
25 TABLET, EXTENDED RELEASE ORAL DAILY
Status: DISCONTINUED | OUTPATIENT
Start: 2023-03-17 | End: 2023-03-18 | Stop reason: HOSPADM

## 2023-03-17 RX ORDER — INSULIN LISPRO 100 [IU]/ML
0-4 INJECTION, SOLUTION INTRAVENOUS; SUBCUTANEOUS
Status: DISCONTINUED | OUTPATIENT
Start: 2023-03-17 | End: 2023-03-18 | Stop reason: HOSPADM

## 2023-03-17 RX ADMIN — METOPROLOL SUCCINATE 25 MG: 25 TABLET, EXTENDED RELEASE ORAL at 14:16

## 2023-03-17 RX ADMIN — SODIUM CHLORIDE, PRESERVATIVE FREE 10 ML: 5 INJECTION INTRAVENOUS at 20:28

## 2023-03-17 RX ADMIN — FINASTERIDE 5 MG: 5 TABLET, FILM COATED ORAL at 08:45

## 2023-03-17 RX ADMIN — Medication 1000 MCG: at 08:45

## 2023-03-17 RX ADMIN — TAMSULOSIN HYDROCHLORIDE 0.4 MG: 0.4 CAPSULE ORAL at 08:45

## 2023-03-17 RX ADMIN — ATORVASTATIN CALCIUM 10 MG: 10 TABLET, FILM COATED ORAL at 20:28

## 2023-03-17 RX ADMIN — IRON SUCROSE 200 MG: 20 INJECTION, SOLUTION INTRAVENOUS at 16:24

## 2023-03-17 RX ADMIN — APIXABAN 5 MG: 5 TABLET, FILM COATED ORAL at 20:28

## 2023-03-17 RX ADMIN — SODIUM CHLORIDE, PRESERVATIVE FREE 10 ML: 5 INJECTION INTRAVENOUS at 11:20

## 2023-03-17 RX ADMIN — DOFETILIDE 250 MCG: 0.25 CAPSULE ORAL at 20:28

## 2023-03-17 RX ADMIN — APIXABAN 5 MG: 5 TABLET, FILM COATED ORAL at 11:20

## 2023-03-17 RX ADMIN — CLOPIDOGREL BISULFATE 75 MG: 75 TABLET ORAL at 15:05

## 2023-03-17 RX ADMIN — LISINOPRIL 20 MG: 20 TABLET ORAL at 08:45

## 2023-03-17 RX ADMIN — CALCIUM GLUCONATE 2000 MG: 20 INJECTION, SOLUTION INTRAVENOUS at 14:16

## 2023-03-17 ASSESSMENT — PAIN SCALES - GENERAL
PAINLEVEL_OUTOF10: 0
PAINLEVEL_OUTOF10: 0

## 2023-03-17 NOTE — PROGRESS NOTES
Iron was infusing in 20g in the LAC and it infiltrated. Swelling noted at the site. Patient left arm elevated on a pillow. Ice was applied to the site. Will continue to monitor.

## 2023-03-17 NOTE — PROGRESS NOTES
Hospitalist Progress Note    Patient:  Altagracia Good      Unit/Bed:8B-31/031-A    YOB: 1945    MRN: [id number]       Acct: [de-identified]     PCP: Christophe Serrato MD    Date of Admission: 3/16/2023    Assessment/Plan:    Asymptomatic Sinus Bradycardia  Denies cardiac complaints. Reviewed EKG/telemetry. No evidence for high degree AV block, prolonged pauses or profound bradycardia. Cardiology following. Home dose of Toprol-XL reduced. No immediate indication for PPM placement. Maintain telemetry. 30-day event monitor on discharge. Paroxysmal Atrial Fibrillation  S/p atrial flutter ablation. Maintained in rhythm by dofetilide. [id number] of 6. Eliquis resumed. Maintain telemetry. CAD  S/p CABG 1998. S/p PCI of RCA w/ LULU 12/2022. Plavix / Eliquis / Statin resumed. Chronic Systolic Heart Failure w/  Reduced EF  Compensated. Echo w/ EF 40-45%, mild global hypokinesis of the LV. Lisinopril/ BB resumed. Monitor intake and output. Daily weights. Primary HTN  Lisinopril and Toprol XL (reduced dose) resumed with hold parameters. Type 2 Diabetes Mellitus  Home Amaryl / metformin held. Low dose corrective algorithm. Hypoglycemic protocol. POC glucose. Acute on Chronic Microcytic Anemia  No evidence of overt bleeding. Anemia labs suggestive of CHRIS. IV venofer initiated. Monitor H&H. Will need GI referral for bidirectional scope as outpatient. Hyperlipidemia  Statin resumed    Expected discharge date:  1 day    Disposition:    [x] Home       [] TCU       [] Rehab       [] Psych       [] SNF       [] [place]       [] Other-    Chief Complaint: Low heart rate    Hospital Course:   74-year-old male with an Hx of CAD, PAF, HTN, CHF, HLD who was admitted for asymptomatic bradycardia. Subjective (past 24 hours):   Patient remains asymptomatic. No profound/sustained bradycardia overnight.   Denies chest pain, shortness of breath, palpitation, lightness, dizziness, syncope or LOC. ROS (12 point review of systems completed. Pertinent positives noted. Otherwise ROS is negative). Medications:  Reviewed    Infusion Medications    sodium chloride       Scheduled Medications    metoprolol succinate  25 mg Oral Daily    iron sucrose  200 mg IntraVENous Q24H    dofetilide  250 mcg Oral Q12H    sodium chloride flush  5-40 mL IntraVENous 2 times per day    apixaban  5 mg Oral BID    clopidogrel  75 mg Oral Daily    cyanocobalamin  1,000 mcg Oral Daily    finasteride  5 mg Oral Daily    lisinopril  20 mg Oral Daily    atorvastatin  10 mg Oral Nightly    tamsulosin  0.4 mg Oral Daily     PRN Meds: sodium chloride flush, sodium chloride, polyethylene glycol, acetaminophen **OR** acetaminophen      Intake/Output Summary (Last 24 hours) at 3/17/2023 2877  Last data filed at 3/17/2023 1552  Gross per 24 hour   Intake 740 ml   Output --   Net 740 ml       Diet:  ADULT DIET; Regular; 5 carb choices (75 gm/meal)  ADULT ORAL NUTRITION SUPPLEMENT; Breakfast, Dinner; Standard High Calorie/High Protein Oral Supplement    Exam:  BP (!) 118/51   Pulse 64   Temp 97.9 °F (36.6 °C) (Oral)   Resp 16   Ht 6' (1.829 m)   Wt 187 lb 1.6 oz (84.9 kg)   SpO2 96%   BMI 25.38 kg/m²     General appearance: No apparent distress, appears stated age and cooperative. HEENT: Pupils equal, round, and reactive to light. Conjunctivae/corneas clear. Neck: Supple, with full range of motion. No jugular venous distention. Trachea midline. Respiratory:  Normal respiratory effort. Clear to auscultation, bilaterally without Rales/Wheezes/Rhonchi. Cardiovascular: Regular rate and rhythm with normal S1/S2 without murmurs, rubs or gallops. Abdomen: Soft, non-tender, non-distended with normal bowel sounds. Musculoskeletal: passive and active ROM x 4 extremities. Skin: Skin color, texture, turgor normal.  No rashes or lesions.   Neurologic:  Neurovascularly intact without any focal sensory/motor deficits. Cranial nerves: II-XII intact, grossly non-focal.  Psychiatric: Alert and oriented, thought content appropriate, normal insight  Capillary Refill: Brisk,< 3 seconds   Peripheral Pulses: +2 palpable, equal bilaterally       Labs:   Recent Labs     03/16/23  1747 03/17/23  0607   WBC 10.0 7.0   HGB 11.5* 10.8*   HCT 37.5* 34.9*    203     Recent Labs     03/17/23  0607 03/17/23  1037     --    K 4.2  --      --    CO2 24  --    BUN 20  --    CREATININE 1.0  --    CALCIUM 8.5  --    PHOS  --  3.7     No results for input(s): AST, ALT, BILIDIR, BILITOT, ALKPHOS in the last 72 hours. No results for input(s): INR in the last 72 hours. No results for input(s): Casper Bigness in the last 72 hours.     Microbiology:      Urinalysis:    No results found for: Clarise Leaver, BACTERIA, RBCUA, BLOODU, [address], [address]    Radiology:  No orders to display       DVT prophylaxis: [] Lovenox                                 [] SCDs                                 [] SQ Heparin                                 [] Encourage ambulation           [x] Already on Anticoagulation-Eliquis     Code Status: Full Code      Tele:   [x] yes             [] no    Electronically signed by Ermelinda Dominguez MD on 3/17/2023 at 5:54 PM

## 2023-03-17 NOTE — CONSULTS
[facility] [address]    Patient's Name/Date of Birth: Idris Glass / 1945 (75 y.o.)    Date: March 17, 2023     Referring Provider: Eb Gandhi DO    CHIEF COMPLAINT: Bradycardia    Reason for Consult: Bradycardia, PM evaluation    HPI: This is a pleasant 69-yo M w/significant PMHx of HTN, HLD, NIDDM2 (Amaryl and metformin), Chronic Systolic CHF (HfrEF 2/2 Ischemic Cardiomyopathy: TTE 10/2020; EF 40-45%, RVSP of 52 mmHg), Permanent A-Fib ([id number] 6; on Eliquis), Hx of Bradycardia, CAD s/p CABG- 1998; [address] [facility] 11/22 - LULU to RCA, Severe mid RCA, Severe native CAD, Patent LIMA to LAD, SVG to OM) on PLavix / Eliquis who presented to [facility] for evaluation of Bradycardia. Dr. Sunita Sim is his Cardiologist at [facility], was at Cardiac rehab w/HR 30's and pt asx, expreienced sinus pauses, was sent for PM evaluation from [facility] cardiology. Per chart review had multiple sinus pauses in the outlying facility. Pt was asymptomatic the entire time. Pt came in w/pacer pads on. Of note, pt is on Tikosyn 250mcg at home (class III AAD). On arrival, VSS, HR >60 the entire time, BP mildly elevated, Trops x2 (-), TSH wnl, Labs unremarkable and resemble chronic/stable findings. Noted to had CHRIS which is chronic, Iron 30, %sat 9. EKG on arrival shows SR w/Sinus arrhythmia, TWI noted in V1-V3 (has been seen in previous EKGs). Echo: No results found for this or any previous visit.      All labs, EKG's, diagnostic testing and images as well as cardiac cath, stress testing were reviewed during this encounter    Past Medical History:   Diagnosis Date    Acute systolic (congestive) heart failure (HCC)     Arthritis     Atrial flutter (HCC)     Blindness of left eye     CAD (coronary artery disease)     Diabetes mellitus ([facility] [address]     GERD (gastroesophageal reflux disease)     Heart attack ([facility] [address] 1998    Hyperlipidemia     Hypertension     Kidney stone     left, ureteral stent Mitral valve regurgitation     patient denies    Paroxysmal atrial fibrillation (HCC)     Severe mitral insufficiency     SOB (shortness of breath)      Past Surgical History:   Procedure Laterality Date    ANKLE FUSION Right     APPENDECTOMY      COLONOSCOPY      CORONARY ANGIOPLASTY WITH STENT PLACEMENT  2012    CORONARY ARTERY BYPASS GRAFT  1998    3 vessels    EYE SURGERY Left     GASTRIC BYPASS SURGERY      LITHOTRIPSY  10/16/2018    ESWL EXTRACORPEAL SHOCK WAVE LITHOTRIPSY LEFT WITH CYSTO AND LEFT STENT REMOVAL   NEXMED (Left     WA LITHOTRIPSY XTRCORP SHOCK WAVE Left 10/16/2018    ESWL EXTRACORPEAL SHOCK WAVE LITHOTRIPSY LEFT WITH CYSTO AND LEFT STENT REMOVAL   NEXMED performed by Raul Zuniga MD at [address] [place] [address] Facility-Administered Medications   Medication Dose Route Frequency Provider Last Rate Last Admin    sodium chloride flush 0.9 % injection 5-40 mL  5-40 mL IntraVENous 2 times per day Arely Bux, DO   10 mL at 03/16/23 2138    sodium chloride flush 0.9 % injection 5-40 mL  5-40 mL IntraVENous PRN Arely Bux, DO        0.9 % sodium chloride infusion   IntraVENous PRN Arely Bux, DO        polyethylene glycol (GLYCOLAX) packet 17 g  17 g Oral Daily PRN Arely Bux, DO        acetaminophen (TYLENOL) tablet 650 mg  650 mg Oral Q6H PRN Arely Bux, DO   650 mg at 03/16/23 2147    Or    acetaminophen (TYLENOL) suppository 650 mg  650 mg Rectal Q6H PRN Arely Bux, DO        apixaban (ELIQUIS) tablet 5 mg  5 mg Oral BID Arely Bux, DO   5 mg at 03/16/23 2138    clopidogrel (PLAVIX) tablet 75 mg  75 mg Oral Daily Arely Bux, DO        vitamin B-12 (CYANOCOBALAMIN) tablet 1,000 mcg  1,000 mcg Oral Daily Arely Bux, DO        finasteride (PROSCAR) tablet 5 mg  5 mg Oral Daily Arely Bux, DO        lisinopril (PRINIVIL;ZESTRIL) tablet 20 mg  20 mg Oral Daily Arely Bux, DO        atorvastatin (LIPITOR) tablet 10 mg  10 mg Oral Nightly Arely Bux, DO   10 mg at 03/16/23 2138    tamsulosin (FLOMAX) capsule 0.4 mg  0.4 mg Oral Daily Arely Jeanne, DO         Prior to Admission medications    Medication Sig Start Date End Date Taking? Authorizing Provider   furosemide (LASIX) 40 MG tablet Take 40 mg by mouth daily as needed   Yes Historical Provider, MD   clopidogrel (PLAVIX) 75 MG tablet Take 1 tablet by mouth daily 12/9/22   [place] Conception Orlop, DO   apixaban (ELIQUIS) 5 MG TABS tablet Take 5 mg by mouth 2 times daily    Historical Provider, MD   metFORMIN (GLUCOPHAGE-XR) 750 MG extended release tablet Take 750 mg by mouth 2 times daily (with meals)    Historical Provider, MD   tamsulosin (FLOMAX) 0.4 MG capsule Take 0.4 mg by mouth daily    Historical Provider, MD   calcium citrate-vitamin D (CITRICAL + D) 315-250 MG-UNIT TABS per tablet Take 1 tablet by mouth 2 times daily (with meals)    Historical Provider, MD   cyanocobalamin 1000 MCG tablet Take 1,000 mcg by mouth daily    Historical Provider, MD   dofetilide (TIKOSYN) 250 MCG capsule Take 250 mcg by mouth every 12 hours    Historical Provider, MD   finasteride (PROSCAR) 5 MG tablet Take 5 mg by mouth daily    Historical Provider, MD   glimepiride (AMARYL) 1 MG tablet Take 1 mg by mouth every morning (before breakfast)    Historical Provider, MD   lisinopril (PRINIVIL;ZESTRIL) 40 MG tablet Take 20 mg by mouth daily    Historical Provider, MD   metoprolol succinate (TOPROL XL) 200 MG extended release tablet Take 100 mg by mouth daily    Historical Provider, MD   Multiple Vitamins-Minerals (THERAPEUTIC MULTIVITAMIN-MINERALS) tablet Take 1 tablet by mouth daily    Historical Provider, MD   nitroGLYCERIN (NITROSTAT) 0.4 MG SL tablet Place 0.4 mg under the tongue every 5 minutes as needed for Chest pain up to max of 3 total doses. If no relief after 1 dose, call 911.     Historical Provider, MD   simvastatin (ZOCOR) 40 MG tablet Take 40 mg by mouth nightly    Historical Provider, MD   spironolactone (ALDACTONE) 25 MG tablet Take 12.5 mg by mouth daily Historical Provider, MD   Scheduled Meds:   sodium chloride flush  5-40 mL IntraVENous 2 times per day    apixaban  5 mg Oral BID    clopidogrel  75 mg Oral Daily    cyanocobalamin  1,000 mcg Oral Daily    finasteride  5 mg Oral Daily    lisinopril  20 mg Oral Daily    atorvastatin  10 mg Oral Nightly    tamsulosin  0.4 mg Oral Daily     Continuous Infusions:   sodium chloride       PRN Meds:.sodium chloride flush, sodium chloride, polyethylene glycol, acetaminophen **OR** acetaminophen    No Known Allergies  History reviewed. No pertinent family history. Social History     Socioeconomic History    Marital status: Single     Spouse name: Not on file    Number of children: Not on file    Years of education: Not on file    Highest education level: Not on file   Occupational History    Not on file   Tobacco Use    Smoking status: Former     Packs/day: 1.00     Types: Cigarettes     Quit date: 0     Years since quittin.2    Smokeless tobacco: Never   Vaping Use    Vaping Use: Never used   Substance and Sexual Activity    Alcohol use: Yes     Comment: social    Drug use: No    Sexual activity: Not on file   Other Topics Concern    Not on file   Social History Narrative    Not on file     Social Determinants of Health     Financial Resource Strain: Not on file   Food Insecurity: Not on file   Transportation Needs: Not on file   Physical Activity: Not on file   Stress: Not on file   Social Connections: Not on file   Intimate Partner Violence: Not on file   Housing Stability: Not on file     ROS:   Constitutional: Denies any recent wt change. Eyes:  Denies any blurring or double vision, no glaucoma  Ears/Nose/Mouth/Throat:  Denies any chronic sinus/rhinitis, bleeding gums  Cardiovascular:  As described above.     Respiratory:  Denies any frequent cough, wheezing or coughing up blood  Genitourinary:  Denies difficulty with urination and kidney stones  Gastrointestinal:  Denies any chronic problems with abdominal pain, nausea, vomiting or diarrhea  Musculoskeletal:  Denies any joint pain, back pain, or difficulty walking  Integumentary:  Denies any rash  Neurological:  No numbness or tingling  Endocrine:  Denies any polydipsia. Hematologic/Lymphatic:  Denies any hemorrhage or lymphatic drainage problems. Labs:  CBC:   Recent Labs     03/16/23  1747 03/17/23  0607   WBC 10.0 7.0   HGB 11.5* 10.8*   HCT 37.5* 34.9*   MCV 80.5 79.1*    203     BMP:   Recent Labs     03/17/23  0607      K 4.2      CO2 24   BUN 20   CREATININE 1.0     Accucheck Glucoses:   Recent Labs     03/17/23  0811   POCGLU 94     Cardiac Enzymes: No results for input(s): CKTOTAL, CKMB, CKMBINDEX, TROPONINI in the last 72 hours. PT/INR: No results for input(s): PROTIME, INR in the last 72 hours. APTT: No results for input(s): APTT in the last 72 hours.   Liver Profile:  No results found for: AST, ALT, ALB, BILIDIR, BILITOT, ALKPHOS, GGT, 5NUC  Lab Results   Component Value Date/Time    CHOL 96 12/08/2022 02:41 PM    HDL 32 12/08/2022 02:41 PM    TRIG 90 12/08/2022 02:41 PM     TSH: No results found for: TSH  UA: No results found for: NITRITE, COLORU, PHUR, LABCAST, WBCUA, RBCUA, MUCUS, TRICHOMONAS, YEAST, BACTERIA, CLARITYU, SPECGRAV, LEUKOCYTESUR, UROBILINOGEN, BILIRUBINUR, BLOODU, GLUCOSEU, AMORPHOUS      Physical Exam:  Vitals:    03/17/23 0352   BP: 130/65   Pulse: 71   Resp: 18   Temp: 97.9 °F (36.6 °C)   SpO2: 98%      Intake/Output Summary (Last 24 hours) at 3/17/2023 8638  Last data filed at 3/16/2023 1941  Gross per 24 hour   Intake 200 ml   Output --   Net 200 ml      General:  No acute distress  Neck: Supple, no JVD, no carotid bruits  Heart: Regular rhythm normal S1 and S2, no rubs, murmurs or gallops  Lungs: clear to ascultation no rales, wheezes, or rhonchi  Abdomen: positive bowel sounds, soft, non-tender, non-distended, no bruits, no masses  Extremities:no clubbing, cyanosis or edema  Neurologic: alert and oriented x 3, cranial nerves 2-12 grossly intact, motor and sensory intact, moving all extremities  Skin: No rashes  Psych: AO x 3, no depression/medina, no pressured speech, normal affect  Lymph: No obvious LAD      Assessment:    Asx Bradycardia: Hx of bradycardia w/Hx of Permanent A-Fib ([id number] 6; on Eliquis). Pt on Tikosyn 250mcg daily + Toprol XL 100mg daily (previously 200 then decreased most recently). Given his increasing need for control and back-and-forth rates, likely pt has SN dysfunction w/Tachy-Tung predominance. Dr. Carole Sierra is his Cardiologist at [facility], was at Cardiac rehab w/HR 30's and pt asx, expreienced sinus pauses. Pt has been NSR w/rates >60 since arrival, occasional Sinus arrhythmia, but no pauses since admission. Resumed Tikosyn 250mcg + restarted Toprol at reduced dosing of 50mg daily in an attempt to perturb any reflex tachyarrhythmias. Monitor Qtc length on these medications   Overall picture indicates at some point pt needs to be evaluated for PPM - need for A-fib rate control w/multiple medications, having episodes of bradycardia (SND / medication induced). Chronic Systolic CHF (HfrEF 2/2 Ischemic Cardiomyopathy: TTE 10/2020; EF 40-45%, RVSP of 52 mmHg)  Compensated at this time w/no signs of fluid overload  GDMT at this time includes Lisinopril, Aldactone, BB  CAD s/p CABG- 1998; [address] [place] [address] 11/22 - LULU to RCA, Severe mid RCA, Severe native CAD, Patent LIMA to LAD, SVG to OM) on PLavix / Eliquis who presented to [facility] for evaluation of Bradycardia. Dr. Carole Sierra is his Cardiologist at [facility].    Trops (-) x2 and no CP     Plan:  C/w Plavix / Eliquis   Restarted Toprol XL at 50mg daily (reduced dose to avoid precipitous HR drops, but to avoid reflexive tachyarrhythmias)  Will monitor overnight and if no sinus pauses will be ok to d/c w/Event monitor   If pt has any more pauses on tele, will keep over weekend and set up for PPM evaluation and placement on Monday   Resumed Home Tikosyn - monitor Qtc   C/w pacer pads on pt for the time being  C/w tolerated GDMT for CHF    Thank you for allowing us to participate in the care of this patient. Please do not hesitate to call us with questions. Time spent reviewing notes, data, discussing with patient/family, and formulating plan with clinical documentation was approximately 80 minutes. Electronically signed by Chuy Card MD on 3/17/2023 at 8:32 AM    Interventional Cardiology - [facility] [address]     I performed the subjective and objective examination of the patient and discussed management with the resident/CNP. I reviewed the resident/CNPs note. Any areas of disagreement were adjusted in the chart. I have personally evaluated this patient and have completed at least one if not all key elements of the E/M (history, physical exam, and MDM). Additional findings are as noted. I agree with the chief complaint, past medical history, past surgical history, allergies, medications, social and family history as documented unless otherwise noted below. Bradycardia (HR 30 during rehab) is asymptomatic   Concern for possible tachycardia-bradycardia syndrome   Patient denies any significant cardiac complaints (no dizziness, syncope, lightheadedness, significant fatigue, SOB or chest pain)  No evidence for high degree AV block, prolonged pauses or profound/sustained bradycardia on telemetry   HR now in 70s  Patient reports ambulating in his room, no symptoms reported   No immediate indication for PPM placement   Resume Dofetilide (patient has been on it for 7 years, last dose was taken yesterday)  Resume Toprol XL at lower dose (cut in half, down to 50 mg po daily)  Cont telemetry  Ambulate in the hallway in am  Possible discharge tomorrow if patient remains asymptomatic with significant vents on telemetry.  Will need 30 day cardiac event monitor on discharge   Cont medical Rx for CAD    Above findings and plan of care were discussed with patient, questions were answered, agreeable to plan. Thank you for allowing me to participate in the care of this patient. Please let me know if I can be of any further assistance.     Electronically signed by Laila Pedraza MD, Isabel Solis on 3/17/2023 at 3:35 PM  Interventional Cardiology - [facility]

## 2023-03-17 NOTE — PLAN OF CARE
Problem: Chronic Conditions and Co-morbidities  Goal: Patient's chronic conditions and co-morbidity symptoms are monitored and maintained or improved  Outcome: Progressing  Care Plan - Patient's Chronic Conditions and Co-Morbidity Symptoms are Monitored and Maintained or Improved: Monitor and assess patient's chronic conditions and comorbid symptoms for stability, deterioration, or improvement  Note: Pt monitored and assessed on my shift       Problem: Discharge Planning  Goal: Discharge to home or other facility with appropriate resources  Outcome: Progressing  Discharge to home or other facility with appropriate resources: Identify barriers to discharge with patient and caregiver  Note: Pt will discharge home when appropriate. Problem: Pain  Goal: Verbalizes/displays adequate comfort level or baseline comfort level  Outcome: Progressing  Note: Pt denies pain on my shift. Non pharmaceutical interventions like ice and repositioning offered before pain medications. Will continue to assess. Pt verbalizes understanding of care plan. Pt contributes to goal settings.

## 2023-03-17 NOTE — PROGRESS NOTES
03/17/23 1447   Encounter Summary   Encounter Overview/Reason  Initial Encounter   Service Provided For: Patient and family together   Referral/Consult From: [address] Family members   Last Encounter  03/17/23   Complexity of Encounter Moderate   Begin Time 1430   End Time  1447   Total Time Calculated 17 min   Encounter    Type Initial Screen/Assessment   Spiritual/Emotional needs   Type Spiritual Support   Assessment/Intervention/Outcome   Assessment Calm   Intervention Active listening;Nurtured Hope;Prayer (assurance of)/[place];Sustaining Presence/Ministry of presence   Outcome Coping   Assessment:  During my encounter with the 65  yr old patient, I gave the patient a copy of the Advance Directive as requested. I assess the pt's spiritual needs. I also came to assess the patient and their family with any spiritual needs. The patient stated that he thinks that he has a Advance Directive but not sure. Interventions:  I gave the pt the requested documents and gave a brochure and asked if the patient had any questions. I offered words of comfort and prayer.  provided a listening presence and encouraged pt to share their beliefs and how they support him during their hospitalization. I encouraged the pt to bring in a copy of his Advance Directive if he has a copy. Outcomes: The pt and the family were thankful for the spiritual support. They shared that they would complete the forms after reviewed. Plan:  Chaplains will follow-up at a later time in order to assist the patient as they complete the Advance Directive documents.   The Chaplains will be available to provide further emotional support per request.

## 2023-03-17 NOTE — PROGRESS NOTES
Physician Progress Note      PATIENT:               Junaid De Leon  CSN #:                  [id number]  :                       1945  ADMIT DATE:       3/16/2023 4:14 PM  [facility] DATE:  RESPONDING  PROVIDER #:        Stephanie Gray DO          QUERY TEXT:    Patient admitted with bradycardia, noted to have permanent atrial fibrillation   and is maintained on Eliquis. If possible, please document in progress notes   and discharge summary if you are evaluating and/or treating any of the   following: The medical record reflects the following:  Risk Factors: Elderly, CAD, HTN, CHF  Clinical Indicators: [id number]-[id number] score of 6  Treatment: medication management, lab monitoring      Thank You! Tish Francis RN, CRCR  RN Clinical Documentation Integrity  (V) [phone] (Z) [phone]  Options provided:  -- Secondary hypercoagulable state in a patient with atrial fibrillation  -- Other - I will add my own diagnosis  -- Disagree - Not applicable / Not valid  -- Disagree - Clinically unable to determine / Unknown  -- Refer to Clinical Documentation Reviewer    PROVIDER RESPONSE TEXT:    This patient has secondary hypercoagulable state in a patient with atrial   fibrillation.     Query created by: Nick Melendrez on 3/17/2023 10:16 AM      Electronically signed by:  Stephanie Gray DO 3/17/2023 6:38 PM

## 2023-03-17 NOTE — H&P
History & Physical       Patient: Shilpa Lama  YOB: 1945    MRN: [id number]     Acct: [de-identified]    PCP: Kwesi Cee MD    Date of Admission: 3/16/2023    Date of Service: Patient seen / examined on 03/16/23 and admitted to Inpatient with expected LOS greater than two midnights due to medical therapy. ASSESSMENT / PLAN:    CAD s/p CABG- LULU to RCA in 11/22: Original CABG done in 1998. Patient's most recent cardiac catheterization in [facility] showed LIMA to be patent as was the SVG to OM graft and the patient subsequently ended up with an RCA PCI. Patient has been going to cardiac rehab. We will resume lisinopril, Plavix and statin. Aspirin was discontinued due to triple therapy with Plavix and Eliquis. We will hold beta-blocker at this time due to #2. Asymptomatic Bradycardia with Sinus Pauses: Per chart review patient was noted to have sinus bradycardia with sinus pauses with heart rates going down as low as 20-29. Patient was also noted to be in sinus pauses with the longest run 5 seconds. Had multiple sinus pauses in the outlying facility. Patient was asymptomatic the entire time. Will consult cardiology for evaluation of pacemaker due to history of atrial fibrillation concerning for tachybradycardia syndrome versus sinus node dysfunction. , maintain telemetry, keep pacer pads. [facility] along with TSH within normal limits. Hyperlipidemia: Resume Statin    Essential HTN: Controlled; Lisinopril resumed; Will hold BB due to #2. Permanent Atrial Fibrillation: [id number] score of 6. Resume Eliquis. We will hold beta-blocker at this time due to #2. Currently rate controlled. Maintain telemetry. Type 2 DM: Hold home Amaryl and metformin. We will start patient on sliding scale if exceeds inpatient goal greater than 180. Accu-Cheks, hypoglycemia protocol.   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 Goal-directed medical therapy includes lisinopril and Aldactone. Hold beta-blocker at this time    Chief Complaint: Bradycardia    History of Present Illness:  65 y.o. male who presented to [facility] with evaluation for bradycardia. Patient has past medical history significant for CAD status post CABG in 1988 with most recent RCA PCI in November 2022. Patient also had an echocardiac done in October 2020 revealed an EF of 40 to 45% with an RVSP of 52 and moderate to severe MR. Patient usually follows with Dr. Tiffanie Martin at [facility]. After most recent stent patient's lisinopril was reduced from 40 to 20 mg daily and aspirin was discontinued as the patient was on triple therapy with Plavix and Eliquis. Patient was sent to cardiac rehab where noted to have some bradycardia. Patient was sent to the ER for further work-up. In the outlying emergency room patient's EKG shows sinus bradycardia with sinus pauses and occasionally was noted to have heart rates going down as low as 22-29 as per the emergency room documentation. Patient was asymptomatic during the whole time. Patient also had his Toprol recently reduced from 200 to 100 mg and did take it the morning of 3/15/2023. Patient is also on Tikosyn 250 mcg twice daily. Per chart review patient was having sinus pauses with the longest around 5 seconds. Patient continues to remain asymptomatic and denied any chest pain, shortness of breath, lightheadedness, or dizziness. Patient has been compliant with all of his medications.       Past Medical History:    Past Medical History:   Diagnosis Date    Acute systolic (congestive) heart failure (HCC)     Arthritis     Atrial flutter (HCC)     Blindness of left eye     CAD (coronary artery disease)     Diabetes mellitus (HCC)     GERD (gastroesophageal reflux disease)     Heart attack ([facility] [address] 1998    Hyperlipidemia     Hypertension     Kidney stone     left, ureteral stent    Mitral valve regurgitation     patient denies    Paroxysmal atrial fibrillation (HCC)     Severe mitral insufficiency     SOB (shortness of breath)      Past Surgical History:    Past Surgical History:   Procedure Laterality Date    ANKLE FUSION Right     APPENDECTOMY      COLONOSCOPY      CORONARY ANGIOPLASTY WITH STENT PLACEMENT  2012    CORONARY ARTERY BYPASS GRAFT  1998    3 vessels    EYE SURGERY Left     GASTRIC BYPASS SURGERY      LITHOTRIPSY  10/16/2018    ESWL EXTRACORPEAL SHOCK WAVE LITHOTRIPSY LEFT WITH CYSTO AND LEFT STENT REMOVAL   NEXMED (Left     SC LITHOTRIPSY XTRCORP SHOCK WAVE Left 10/16/2018    ESWL EXTRACORPEAL SHOCK WAVE LITHOTRIPSY LEFT WITH CYSTO AND LEFT STENT REMOVAL   NEXMED performed by Sharon Longo MD at [address] [place] [address]        Medications Prior to Admission:   No current facility-administered medications on file prior to encounter.      Current Outpatient Medications on File Prior to Encounter   Medication Sig Dispense Refill    furosemide (LASIX) 40 MG tablet Take 40 mg by mouth daily as needed      clopidogrel (PLAVIX) 75 MG tablet Take 1 tablet by mouth daily 30 tablet 3    apixaban (ELIQUIS) 5 MG TABS tablet Take 5 mg by mouth 2 times daily      metFORMIN (GLUCOPHAGE-XR) 750 MG extended release tablet Take 750 mg by mouth 2 times daily (with meals)      tamsulosin (FLOMAX) 0.4 MG capsule Take 0.4 mg by mouth daily      calcium citrate-vitamin D (CITRICAL + D) 315-250 MG-UNIT TABS per tablet Take 1 tablet by mouth 2 times daily (with meals)      cyanocobalamin 1000 MCG tablet Take 1,000 mcg by mouth daily      dofetilide (TIKOSYN) 250 MCG capsule Take 250 mcg by mouth every 12 hours      finasteride (PROSCAR) 5 MG tablet Take 5 mg by mouth daily      glimepiride (AMARYL) 1 MG tablet Take 1 mg by mouth every morning (before breakfast)      lisinopril (PRINIVIL;ZESTRIL) 40 MG tablet Take 20 mg by mouth daily      metoprolol succinate (TOPROL XL) 200 MG extended release tablet Take 100 mg by mouth daily      Multiple Vitamins-Minerals (THERAPEUTIC MULTIVITAMIN-MINERALS) tablet Take 1 tablet by mouth daily      nitroGLYCERIN (NITROSTAT) 0.4 MG SL tablet Place 0.4 mg under the tongue every 5 minutes as needed for Chest pain up to max of 3 total doses. If no relief after 1 dose, call 911. simvastatin (ZOCOR) 40 MG tablet Take 40 mg by mouth nightly      spironolactone (ALDACTONE) 25 MG tablet Take 12.5 mg by mouth daily       Allergies:   Patient has no known allergies. Social History:   Social History     Socioeconomic History    Marital status: Single     Spouse name: Not on file    Number of children: Not on file    Years of education: Not on file    Highest education level: Not on file   Occupational History    Not on file   Tobacco Use    Smoking status: Former     Packs/day: 1.00     Types: Cigarettes     Quit date: 0     Years since quittin.2    Smokeless tobacco: Never   Vaping Use    Vaping Use: Never used   Substance and Sexual Activity    Alcohol use: Yes     Comment: social    Drug use: No    Sexual activity: Not on file   Other Topics Concern    Not on file   Social History Narrative    Not on file     Social Determinants of Health     Financial Resource Strain: Not on file   Food Insecurity: Not on file   Transportation Needs: Not on file   Physical Activity: Not on file   Stress: Not on file   Social Connections: Not on file   Intimate Partner Violence: Not on file   Housing Stability: Not on file     Family History:    History reviewed. No pertinent family history.   REVIEW OF SYSTEMS:  A 14-point ROS was obtained and negative, with the exception of pertinent positives as listed below:      PHYSICAL EXAM:  Vitals:    23 1633 23 1700   BP: (!) 144/72    Pulse: 64    Resp: 18    Temp: 98.2 °F (36.8 °C)    TempSrc: Oral    SpO2: 97%    Weight:  187 lb 1.6 oz (84.9 kg) Height:  6' (1.829 m)     General appearance: Alert / well-appearing. Cooperative. NAD. HEENT:  Normocephalic / atraumatic. PERRL. EOM intact. Conjunctivae appear normal.  Neck: Supple. No JVD. Respiratory: Normal respiratory effort on RA. CTAB. No wheezes / rales / rhonchi. Cardiovascular: RRR. Normal S1/S2. No murmurs / rubs / gallops. Abdomen: Soft / non-tender / non-distended. BS present. Musculoskeletal: No cyanosis or edema. Skin: Warm / dry. Normal turgor. Neurologic: A/O x 3. Speech normal. Answers questions appropriately. CN intact. No obvious focal neurologic deficits. Psychiatric: Thought content / judgment / insight appear appropriate. Capillary refill: Brisk bilaterally. Peripheral pulses: +2 bilaterally.     Labs:   Results for orders placed or performed during the hospital encounter of 03/16/23   CBC with Auto Differential   Result Value Ref Range    WBC 10.0 4.8 - 10.8 thou/mm3    RBC 4.66 (L) 4.70 - 6.10 mill/mm3    Hemoglobin 11.5 (L) 14.0 - 18.0 gm/dl    Hematocrit 37.5 (L) 42.0 - 52.0 %    MCV 80.5 80.0 - 94.0 fL    MCH 24.7 (L) 26.0 - 33.0 pg    MCHC 30.7 (L) 32.2 - 35.5 gm/dl    RDW-CV 15.5 (H) 11.5 - 14.5 %    RDW-SD 45.0 35.0 - 45.0 fL    Platelets 902 720 - 675 thou/mm3    MPV 12.9 (H) 9.4 - 12.4 fL    Seg Neutrophils 68.2 %    Lymphocytes 18.4 %    Monocytes 10.9 %    Eosinophils 1.6 %    Basophils 0.7 %    Immature Granulocytes 0.2 %    Segs Absolute 6.8 1.8 - 7.7 thou/mm3    Lymphocytes Absolute 1.8 1.0 - 4.8 thou/mm3    Monocytes Absolute 1.1 0.4 - 1.3 thou/mm3    Eosinophils Absolute 0.2 0.0 - 0.4 thou/mm3    Basophils Absolute 0.1 0.0 - 0.1 thou/mm3    Immature Grans (Abs) 0.02 0.00 - 0.07 thou/mm3    nRBC 0 /100 wbc   EKG 12 Lead   Result Value Ref Range    Ventricular Rate 63 BPM    Atrial Rate 63 BPM    P-R Interval 126 ms    QRS Duration 94 ms    Q-T Interval 532 ms    QTc Calculation (Bazett) 544 ms    P Axis 68 degrees    R Axis 29 degrees    T Axis 103 degrees EKG / Radiology:     EKG:  Reviewed by me --    CXR:   Reviewed by me --    No results found. CODE STATUS:  Full    Thank you Annmarie Smith MD for the opportunity to be involved in this patient's care.     Electronically signed by Rock Luke DO on 3/16/2023 at 8:54 PM

## 2023-03-17 NOTE — CARE COORDINATION
Case Management Assessment  Initial Evaluation    Date/Time of Evaluation: 3/17/2023 11:32 AM  Assessment Completed by: Lorraine Dwyer RN    If patient is discharged prior to next notation, then this note serves as note for discharge by case management. Patient Name: Iain Santiago                   YOB: 1945  Diagnosis: Bradycardia [R00.1]                   Date / Time: 3/16/2023  4:14 PM  Location: [address] [place] [address]     Patient Admission Status: Inpatient   Readmission Risk Low 0-14, Mod 15-19), High > 20: Readmission Risk Score: 8.9    Current PCP: Alfreda White MD  PCP verified by CM? Yes    Chart Reviewed: Yes      History Provided by: Patient  Patient Orientation: Alert and Oriented    Patient Cognition: Alert    Hospitalization in the last 30 days (Readmission):  No    If yes, Readmission Assessment in CM Navigator will be completed. Advance Directives:      Code Status: Full Code   Patient's Primary Decision Maker is: Legal Next of Kin    Primary Decision MakerMurphy Precious - Child - [phone]    Discharge Planning:    Patient lives with: Spouse/Significant Other Type of Home: House  Primary Care Giver: Self  Patient Support Systems include: Spouse/Significant Other   Current Financial resources: Medicare  Current community resources: None  Current services prior to admission: None            Current DME:              Type of Home Care services:  None    ADLS  Prior functional level: Independent in ADLs/IADLs  Current functional level: Independent in ADLs/IADLs    Family can provide assistance at DC: Yes  Would you like Case Management to discuss the discharge plan with any other family members/significant others, and if so, who?  No  Plans to Return to Present Housing: Yes  Other Identified Issues/Barriers to RETURNING to current housing: No  Potential Assistance needed at discharge: N/A            Potential DME:    Patient expects to discharge to: [address] [place] [address] transportation at discharge: Self    Financial    Payor: Wilmer De Los Santos / Plan: Kathleen Servant / Product Type: *No Product type* /     Does insurance require precert for SNF: Yes    Potential assistance Purchasing Medications: No  Meds-to-Beds request: Yes      [address], [address] [facility], [phone] Pamla Erps [phone]  [address] [facility] [address]  Phone: [phone] Fax: [phone]      Notes:    Factors facilitating achievement of predicted outcomes: Friend support, Cooperative, and Pleasant    Barriers to discharge: Medical complications. Complete bradycardia assessment. Additional Case Management Notes: Admitted from [facility] with hx CAD and stent one mo ago who now has bradycardia (during cardiac rehab). Beta blocker has been held but still revealed some sinus pauses. Eval for pacer. Cardiology consulted. Procedure: No    The Plan for Transition of Care is related to the following treatment goals of Bradycardia [R00.1]    Patient Goals/Plan/Treatment Preferences: Met with Padmini Sutton and his SO whom he resides with. He is self sufficient and independent and active. He drives, is insured, has a PCP and no issues getting meds. No discharge needs anticipated. Transportation/Food Security/Housekeeping Addressed: No issues identified.      Jung Cardenas RN  Case Management Department

## 2023-03-18 VITALS
SYSTOLIC BLOOD PRESSURE: 112 MMHG | HEIGHT: 72 IN | BODY MASS INDEX: 25.34 KG/M2 | HEART RATE: 57 BPM | WEIGHT: 187.1 LBS | OXYGEN SATURATION: 95 % | DIASTOLIC BLOOD PRESSURE: 54 MMHG | TEMPERATURE: 97.5 F | RESPIRATION RATE: 16 BRPM

## 2023-03-18 LAB
ANION GAP SERPL CALC-SCNC: 11 MEQ/L (ref 8–16)
BASOPHILS ABSOLUTE: 0.1 THOU/MM3 (ref 0–0.1)
BASOPHILS NFR BLD AUTO: 0.6 %
BUN SERPL-MCNC: 28 MG/DL (ref 7–22)
CALCIUM SERPL-MCNC: 8.8 MG/DL (ref 8.5–10.5)
CHLORIDE SERPL-SCNC: 103 MEQ/L (ref 98–111)
CO2 SERPL-SCNC: 23 MEQ/L (ref 23–33)
CREAT SERPL-MCNC: 1.1 MG/DL (ref 0.4–1.2)
DEPRECATED RDW RBC AUTO: 46.3 FL (ref 35–45)
EKG ATRIAL RATE: 59 BPM
EKG ATRIAL RATE: 64 BPM
EKG ATRIAL RATE: 69 BPM
EKG P AXIS: 43 DEGREES
EKG P AXIS: 59 DEGREES
EKG P AXIS: 67 DEGREES
EKG P-R INTERVAL: 166 MS
EKG P-R INTERVAL: 178 MS
EKG P-R INTERVAL: 178 MS
EKG Q-T INTERVAL: 410 MS
EKG Q-T INTERVAL: 456 MS
EKG Q-T INTERVAL: 486 MS
EKG Q-T INTERVAL: 494 MS
EKG QRS DURATION: 90 MS
EKG QRS DURATION: 92 MS
EKG QRS DURATION: 92 MS
EKG QRS DURATION: 94 MS
EKG QTC CALCULATION (BAZETT): 439 MS
EKG QTC CALCULATION (BAZETT): 470 MS
EKG QTC CALCULATION (BAZETT): 489 MS
EKG QTC CALCULATION (BAZETT): 501 MS
EKG R AXIS: 47 DEGREES
EKG R AXIS: 48 DEGREES
EKG R AXIS: 51 DEGREES
EKG R AXIS: 52 DEGREES
EKG T AXIS: 100 DEGREES
EKG T AXIS: 102 DEGREES
EKG T AXIS: 105 DEGREES
EKG T AXIS: 92 DEGREES
EKG VENTRICULAR RATE: 59 BPM
EKG VENTRICULAR RATE: 64 BPM
EKG VENTRICULAR RATE: 64 BPM
EKG VENTRICULAR RATE: 69 BPM
EOSINOPHIL NFR BLD AUTO: 0.2 %
EOSINOPHILS ABSOLUTE: 0 THOU/MM3 (ref 0–0.4)
ERYTHROCYTE [DISTWIDTH] IN BLOOD BY AUTOMATED COUNT: 15.5 % (ref 11.5–14.5)
GFR SERPL CREATININE-BSD FRML MDRD: > 60 ML/MIN/1.73M2
GLUCOSE BLD STRIP.AUTO-MCNC: 141 MG/DL (ref 70–108)
GLUCOSE BLD STRIP.AUTO-MCNC: 157 MG/DL (ref 70–108)
GLUCOSE SERPL-MCNC: 160 MG/DL (ref 70–108)
HCT VFR BLD AUTO: 36.9 % (ref 42–52)
HGB BLD-MCNC: 11.2 GM/DL (ref 14–18)
IMM GRANULOCYTES # BLD AUTO: 0.06 THOU/MM3 (ref 0–0.07)
IMM GRANULOCYTES NFR BLD AUTO: 0.5 %
LYMPHOCYTES ABSOLUTE: 0.5 THOU/MM3 (ref 1–4.8)
LYMPHOCYTES NFR BLD AUTO: 4.3 %
MCH RBC QN AUTO: 24.8 PG (ref 26–33)
MCHC RBC AUTO-ENTMCNC: 30.4 GM/DL (ref 32.2–35.5)
MCV RBC AUTO: 81.8 FL (ref 80–94)
MONOCYTES ABSOLUTE: 1.1 THOU/MM3 (ref 0.4–1.3)
MONOCYTES NFR BLD AUTO: 9 %
NEUTROPHILS NFR BLD AUTO: 85.4 %
NRBC BLD AUTO-RTO: 0 /100 WBC
PLATELET # BLD AUTO: 202 THOU/MM3 (ref 130–400)
PMV BLD AUTO: 12.8 FL (ref 9.4–12.4)
POTASSIUM SERPL-SCNC: 4.2 MEQ/L (ref 3.5–5.2)
RBC # BLD AUTO: 4.51 MILL/MM3 (ref 4.7–6.1)
SEGMENTED NEUTROPHILS ABSOLUTE COUNT: 10.8 THOU/MM3 (ref 1.8–7.7)
SODIUM SERPL-SCNC: 137 MEQ/L (ref 135–145)
WBC # BLD AUTO: 12.7 THOU/MM3 (ref 4.8–10.8)

## 2023-03-18 PROCEDURE — 6370000000 HC RX 637 (ALT 250 FOR IP): Performed by: STUDENT IN AN ORGANIZED HEALTH CARE EDUCATION/TRAINING PROGRAM

## 2023-03-18 PROCEDURE — 80048 BASIC METABOLIC PNL TOTAL CA: CPT

## 2023-03-18 PROCEDURE — 85025 COMPLETE CBC W/AUTO DIFF WBC: CPT

## 2023-03-18 PROCEDURE — 2580000003 HC RX 258: Performed by: STUDENT IN AN ORGANIZED HEALTH CARE EDUCATION/TRAINING PROGRAM

## 2023-03-18 PROCEDURE — 36415 COLL VENOUS BLD VENIPUNCTURE: CPT

## 2023-03-18 PROCEDURE — 82948 REAGENT STRIP/BLOOD GLUCOSE: CPT

## 2023-03-18 PROCEDURE — 93270 REMOTE 30 DAY ECG REV/REPORT: CPT

## 2023-03-18 PROCEDURE — 99239 HOSP IP/OBS DSCHRG MGMT >30: CPT | Performed by: INTERNAL MEDICINE

## 2023-03-18 RX ORDER — BISMUTH SUBSALICYLATE 262 MG/1
524 TABLET, CHEWABLE ORAL
Status: DISCONTINUED | OUTPATIENT
Start: 2023-03-18 | End: 2023-03-18 | Stop reason: HOSPADM

## 2023-03-18 RX ORDER — FERROUS SULFATE 325(65) MG
325 TABLET ORAL EVERY OTHER DAY
Qty: 30 TABLET | Refills: 0 | Status: SHIPPED | OUTPATIENT
Start: 2023-03-18

## 2023-03-18 RX ORDER — LISINOPRIL 40 MG/1
20 TABLET ORAL DAILY
Qty: 30 TABLET | Refills: 3 | Status: SHIPPED
Start: 2023-03-18

## 2023-03-18 RX ORDER — SPIRONOLACTONE 25 MG/1
12.5 TABLET ORAL DAILY
Qty: 30 TABLET | Refills: 3 | Status: SHIPPED | OUTPATIENT
Start: 2023-03-18

## 2023-03-18 RX ADMIN — Medication 1000 MCG: at 10:20

## 2023-03-18 RX ADMIN — DOFETILIDE 250 MCG: 0.25 CAPSULE ORAL at 10:20

## 2023-03-18 RX ADMIN — TAMSULOSIN HYDROCHLORIDE 0.4 MG: 0.4 CAPSULE ORAL at 10:23

## 2023-03-18 RX ADMIN — FINASTERIDE 5 MG: 5 TABLET, FILM COATED ORAL at 10:20

## 2023-03-18 RX ADMIN — CLOPIDOGREL BISULFATE 75 MG: 75 TABLET ORAL at 10:20

## 2023-03-18 RX ADMIN — APIXABAN 5 MG: 5 TABLET, FILM COATED ORAL at 10:20

## 2023-03-18 RX ADMIN — SODIUM CHLORIDE, PRESERVATIVE FREE 10 ML: 5 INJECTION INTRAVENOUS at 10:21

## 2023-03-18 ASSESSMENT — PAIN SCALES - GENERAL: PAINLEVEL_OUTOF10: 0

## 2023-03-18 NOTE — PROGRESS NOTES
Discharge teaching and instructions for diagnosis/procedure of completed with patient using teachback method. AVS reviewed. Patient voiced understanding regarding prescriptions, follow up appointments, and care of self at home. Discharged in a wheelchair to  home with support per family.

## 2023-03-18 NOTE — PLAN OF CARE
Problem: Chronic Conditions and Co-morbidities  Goal: Patient's chronic conditions and co-morbidity symptoms are monitored and maintained or improved  Outcome: Progressing  Note: Pt monitored and assessed on my shift       Problem: Discharge Planning  Goal: Discharge to home or other facility with appropriate resources  Outcome: Progressing  Note: Pt will discharge home when appropriate. Problem: Pain  Goal: Verbalizes/displays adequate comfort level or baseline comfort level  Outcome: Progressing  Note: Pt denies pain on my shift. Non pharmaceutical interventions like ice and repositioning offered before pain medications. Will continue to assess. Pt verbalizes understanding of care plan. Pt contributes to goal settings.

## 2023-03-18 NOTE — PROCEDURES
EKG was handed to [facility] [employer]. A one minute rhythm strip was performed for diagnostic purposes due to rhythm changes.

## 2023-03-18 NOTE — PROGRESS NOTES
Pt called out c/o light headiness, diaphoretic , dry heaves, nausea, and clamminess. EKG was performed, Vitals obtained, Hospitalist made aware and seen patient at this time. All symptoms resolved within 3 hours.

## 2023-03-18 NOTE — DISCHARGE INSTRUCTIONS
30 day event monitor at discharge. [facility]

## 2023-03-18 NOTE — PROGRESS NOTES
Pt ambulated about 100 feet per cardiology recommendations. Pt tolerated well and was without signs/symptoms of bradycardia.

## 2023-03-18 NOTE — CARE COORDINATION
3/18/23, 2:05 PM EDT    Patient goals/plan/ treatment preferences discussed by  and . Patient goals/plan/ treatment preferences reviewed with patient/ family. Patient/ family verbalize understanding of discharge plan and are in agreement with goal/plan/treatment preferences. Understanding was demonstrated using the teach back method. AVS provided by RN at time of discharge, which includes all necessary medical information pertaining to the patients current course of illness, treatment, post-discharge goals of care, and treatment preferences.      Services At/After Discharge: None       IMM Letter  IMM Letter given to Patient/Family/Significant other/Guardian/POA/by[de-identified] Elle GALINDO Case Manager  IMM Letter date given[de-identified] 03/17/23  IMM Letter time given[de-identified] 044

## 2023-03-18 NOTE — PROGRESS NOTES
Patient stable this AM. Afib 40 - 50's without significant pauses. Patient feels well. No anginal sx, no lightheadedness or dizziness. No evidence of decompensated HF. Stable for discharge to home today from cardiology standpoint. 30 day event monitor at discharge. He already has follow-up with his PCP and primary cardiologist in [facility] this week. Please call with questions or concerns.    Nyla Montano, APRN - CNP

## 2023-03-19 NOTE — DISCHARGE SUMMARY
Hospital Medicine Discharge Summary      Patient Identification:   Chino Hernandez   : 1945  MRN: [id number]   Account: [de-identified]   Patient's PCP: Marjorie Manriquez MD    Admit Date: 3/16/2023   Discharge Date: 3/18/2023      Admitting Physician: Viviane Mantilla MD  Discharge Physician: Erika Lopez DO       Discharge Diagnoses:  Slow Ventricular rate (with hx of AF): symptomatic dizziness. Resolved. DC on event monitor 30 day, and stopped toprol. Okay to continue dofetilide per cardiology. No evidence of high degree AV block or significant pauses  Atrial fibrillation with secondary hypercoagulable state: DC on dofetilide, stop Toprol, continue Eliquis  Relative low blood pressure: Likely mediated by low normal heart rate. His lisinopril and Aldactone will be held on discharge, advised to follow-up with his usual cardiologist in regards to restarting. Chronic HFrEF: Echo 40-45% with mild elevation of RVSP with mod TR (22 echo 40%). Similar to prior EF. Chronic iron deficiency anemia: Starting on iron supplements, recommend follow-up with GI for further work-up. History of CAD status post CABG 98 and PCI 2022, chronic HFrEF, hypertension, and NIDDM 2    Hospital Course:   Chino Hernandez is a 65 y.o. male with PMHx CAD status post CABG 80 and PCI 2022, paroxysmal A-fib on Eliquis and dofetilide/Toprol, chronic HFrEF, hypertension, and NIDDM 2, chronic anemia admitted to [facility] on 3/16/2023 for an episode of dizziness in which she was found to have bradycardia and hypotension. The patient was admitted to [place] [address] with telemetry monitoring and cardiology was consulted. He was noted to be on dofetilide and Toprol 100 mg twice daily (recently decreased), and cardiology recommended cutting back Toprol to 25 MG daily.   After getting this dose and continued on his usual dose of dofetilide, he had another episode of slow ventricular response noted overnight, however this did not recur following this episode. Cardiology cleared for discharge off of Toprol and can continue his usual dose of dofetilide. Echo revealed stable EF from his previous known HFrEF. He had no episodes of chest pain throughout his stay. He has a follow-up appointment with his cardiologist on 3/20/2023 through [facility] [facility] (Dr. Mansoor Godinez) and will wear an event monitor for 30 days upon discharge for further evaluation. Discussed tricked return precautions should he have any further episodes. Also, the patient had relatively low blood pressure which was likely related to his low normal heart rate. His Aldactone and lisinopril were held during the inpatient stay, and was instructed to hold these until he follows up with his cardiologist this coming Monday for further discussion. He is ambulating without any issues stable for discharge. The patient was seen and examined on day of discharge and this discharge summary is in conjunction with any daily progress note from day of discharge. The patient is discharged in stable condition. Exam:   Vitals:  Vitals:    03/18/23 0230 03/18/23 0930 03/18/23 1214 03/18/23 1512   BP: (!) 113/49 (!) 104/52 (!) 105/57 (!) 112/54   Pulse: 76 (!) 49 53 57   Resp: 18 16 17 16   Temp: 99.3 °F (37.4 °C) 97.6 °F (36.4 °C) 97.7 °F (36.5 °C) 97.5 °F (36.4 °C)   TempSrc: Oral Oral Oral Oral   SpO2: 90% 96% 95% 95%   Weight:       Height:         Weight: Weight: 187 lb 1.6 oz (84.9 kg)     General appearance: No apparent distress, well developed, appears stated age. Eyes:  Pupils equal, round, and reactive to light. Conjunctivae/corneas clear. HENT: Head normal in appearance. External nares normal.  Oral mucosa moist without lesions. Hearing grossly intact. Neck: Supple, with full range of motion. Trachea midline. No gross JVD appreciated. Respiratory:  Normal respiratory effort.  Clear to auscultation, bilaterally without rales or wheezes or rhonchi. Cardiovascular: Normal rate, irregular rhythm with normal S1/S2 without murmurs. No lower extremity edema. Abdomen: Soft, non-tender, non-distended with normal bowel sounds. Musculoskeletal: There is no joint swelling or tenderness. Normal tone. No abnormal movements. Skin: Warm and dry. No rashes or lesions. Neurologic:  No focal sensory/motor deficits in the upper and lower extremities. Cranial nerves:  grossly non-focal 2-12. Psychiatric: Alert and oriented, normal insight and thought content. Significant Diagnostic Studies    Labs:  For convenience and continuity at follow-up the following most recent labs are provided:  CBC:    Lab Results   Component Value Date/Time    WBC 12.7 03/18/2023 06:19 AM    HGB 11.2 03/18/2023 06:19 AM    HCT 36.9 03/18/2023 06:19 AM     03/18/2023 06:19 AM     Renal:    Lab Results   Component Value Date/Time     03/18/2023 06:20 AM    K 4.2 03/18/2023 06:20 AM     03/18/2023 06:20 AM    CO2 23 03/18/2023 06:20 AM    BUN 28 03/18/2023 06:20 AM    CREATININE 1.1 03/18/2023 06:20 AM    CALCIUM 8.8 03/18/2023 06:20 AM    PHOS 3.7 03/17/2023 10:37 AM       Radiology:   No orders to display          Consults:   IP CONSULT TO CARDIOLOGY  IP CONSULT TO SPIRITUAL SERVICES      Disposition: Home  Condition at Discharge: Stable    Code Status:  Prior full    Patient Instructions:    Discharge lab work: na  Activity: activity as tolerated  Diet: No diet orders on file      Follow-up visits:   Beverly Norton MD  [address]. [place]   [phone]    Schedule an appointment as soon as possible for a visit in 1 week(s)           Discharge Medications:        Medication List        START taking these medications      ferrous sulfate 325 (65 Fe) MG tablet  Commonly known as: IRON 325  Take 1 tablet by mouth every other day            CHANGE how you take these medications      lisinopril 40 MG tablet  Commonly known as: PRINIVIL;ZESTRIL  Take 0.5 tablets by mouth daily Hold until follow up with Cardiologist on 3/20/23  What changed: additional instructions     spironolactone 25 MG tablet  Commonly known as: ALDACTONE  Take 0.5 tablets by mouth daily Hold until follow up with cardiologist on 3/20/23  What changed: additional instructions            CONTINUE taking these medications      apixaban 5 MG Tabs tablet  Commonly known as: ELIQUIS     calcium citrate-vitamin D 315-250 MG-UNIT Tabs per tablet  Commonly known as: CITRICAL + D     clopidogrel 75 MG tablet  Commonly known as: PLAVIX  Take 1 tablet by mouth daily     cyanocobalamin 1000 MCG tablet     dofetilide 250 MCG capsule  Commonly known as: TIKOSYN     finasteride 5 MG tablet  Commonly known as: PROSCAR     furosemide 40 MG tablet  Commonly known as: LASIX     glimepiride 1 MG tablet  Commonly known as: AMARYL     metFORMIN 750 MG extended release tablet  Commonly known as: GLUCOPHAGE-XR     nitroGLYCERIN 0.4 MG SL tablet  Commonly known as: NITROSTAT     simvastatin 40 MG tablet  Commonly known as: ZOCOR     tamsulosin 0.4 MG capsule  Commonly known as: FLOMAX     therapeutic multivitamin-minerals tablet            STOP taking these medications      metoprolol succinate 200 MG extended release tablet  Commonly known as: TOPROL XL               Where to Get Your Medications        These medications were sent to [address] [place] [address], [address] [place] [address] -  [phone]  [place], [place] [address]      Phone: [phone]   ferrous sulfate 325 (65 Fe) MG tablet  spironolactone 25 MG tablet       Information about where to get these medications is not yet available    Ask your nurse or doctor about these medications  lisinopril 40 MG tablet                Time Spent on discharge is 35 minutes in the examination, evaluation, counseling and review of medications and discharge plan.     Thank you Panda Hilario MD for the opportunity to be involved in this patient's care.       Signed:    Electronically signed by Heavenly Pack DO on 3/19/23 at 4:35 PM EDT

## 2023-04-08 ENCOUNTER — CLINICAL DOCUMENTATION (OUTPATIENT)
Dept: NON INVASIVE DIAGNOSTICS | Age: 78
End: 2023-04-08

## 2023-04-08 NOTE — PROGRESS NOTES
Critical event report received from patient's cardiac event monitor. Report states \"Atrial Fibrillation RVR (167.1 bmp), Ventricular Tachycardia (>36 sec) (175.9 bpm) w/ Run of V-Tach (4-6 Beats)/Couplet PVCs/PVCs (6 in 1 min). \" Spoke with Nyla Anna on the phone and she stated she would look into the report. Report was faxed to [facility] office.

## 2023-04-09 ENCOUNTER — CLINICAL DOCUMENTATION (OUTPATIENT)
Dept: NON INVASIVE DIAGNOSTICS | Age: 78
End: 2023-04-09

## 2023-04-10 ENCOUNTER — TELEPHONE (OUTPATIENT)
Dept: CARDIOLOGY CLINIC | Age: 78
End: 2023-04-10

## 2023-04-10 NOTE — PROGRESS NOTES
-2030 Critical Report fax received from [employer] about an Auto trigger JOSE event @1743 cst (1843 est)    -attempted to contact patient by telephone, no success.   -Checked patient's current telemetry to see if issue was ongoing.   -Perfect Served the on Call, Clau Heath to share results.   -faxed   -Bobby Álvarez was able to make contact with the patient.  -noting in patient chart

## 2023-05-05 ENCOUNTER — TELEPHONE (OUTPATIENT)
Dept: CARDIOLOGY CLINIC | Age: 78
End: 2023-05-05

## 2023-05-05 DIAGNOSIS — I48.0 PAF (PAROXYSMAL ATRIAL FIBRILLATION) (HCC): ICD-10-CM

## 2023-05-05 DIAGNOSIS — I49.5 TACHY-BRADY SYNDROME (HCC): Primary | ICD-10-CM

## 2023-05-08 NOTE — TELEPHONE ENCOUNTER
Pt states he already sees an EP in [place] and he sees him on Thursday   Nichole notified not to call pt abnormal/expand...

## 2024-04-01 NOTE — PROCEDURES
The skin was prepped and a 30 day cardiac event monitor was applied. The patient was instructed on the documentation of symptoms and the purpose of the monitor as well as the things to avoid while wearing the monitor. The patient was instructed to remove and return the monitor on 04/17/2023.   The serial number of the monitor that was applied is [id number]

## (undated) DEVICE — CONTAINER,SPECIMEN,OR STERILE,4OZ: Brand: MEDLINE

## (undated) DEVICE — DUP USE 240185 SOLUTION IV IRRIG WATER 1000ML IRRIG BAG 2B7114

## (undated) DEVICE — YANKAUER,FLEXIBLE HANDLE,REGLR CAPACITY: Brand: MEDLINE INDUSTRIES, INC.

## (undated) DEVICE — TUBING, SUCTION, 1/4" X 12', STRAIGHT: Brand: MEDLINE

## (undated) DEVICE — CYSTO/BLADDER IRRIGATION SET, REGULATING CLAMP

## (undated) DEVICE — SKIN PREP TRAY W/CHG: Brand: MEDLINE INDUSTRIES, INC.

## (undated) DEVICE — CHECK-FLO HEMOSTASIS ASSEMBLY: Brand: CHECK-FLO

## (undated) DEVICE — GLOVE SURG SZ 75 L12IN FNGR THK87MIL WHT LTX FREE